# Patient Record
Sex: MALE | Race: WHITE | NOT HISPANIC OR LATINO | URBAN - METROPOLITAN AREA
[De-identification: names, ages, dates, MRNs, and addresses within clinical notes are randomized per-mention and may not be internally consistent; named-entity substitution may affect disease eponyms.]

---

## 2018-04-19 ENCOUNTER — EMERGENCY (EMERGENCY)
Facility: HOSPITAL | Age: 63
LOS: 0 days | Discharge: HOME | End: 2018-04-19
Attending: EMERGENCY MEDICINE
Payer: MEDICAID

## 2018-04-19 VITALS
DIASTOLIC BLOOD PRESSURE: 87 MMHG | SYSTOLIC BLOOD PRESSURE: 148 MMHG | TEMPERATURE: 98 F | HEART RATE: 97 BPM | HEIGHT: 70 IN | WEIGHT: 188.94 LBS | RESPIRATION RATE: 16 BRPM | OXYGEN SATURATION: 98 %

## 2018-04-19 VITALS
HEART RATE: 91 BPM | OXYGEN SATURATION: 97 % | RESPIRATION RATE: 18 BRPM | SYSTOLIC BLOOD PRESSURE: 133 MMHG | DIASTOLIC BLOOD PRESSURE: 87 MMHG
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
## 2018-04-19 DIAGNOSIS — M25.572 PAIN IN LEFT ANKLE AND JOINTS OF LEFT FOOT: ICD-10-CM
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
## 2018-04-19 DIAGNOSIS — S82.842A DISPLACED BIMALLEOLAR FRACTURE OF LEFT LOWER LEG, INITIAL ENCOUNTER FOR CLOSED FRACTURE: ICD-10-CM

## 2018-04-19 PROCEDURE — 73590 X-RAY EXAM OF LOWER LEG: CPT | Mod: 26,LT

## 2018-04-19 PROCEDURE — 72170 X-RAY EXAM OF PELVIS: CPT | Mod: 26

## 2018-04-19 PROCEDURE — 73600 X-RAY EXAM OF ANKLE: CPT | Mod: 26,LT

## 2018-04-19 PROCEDURE — 99284 EMERGENCY DEPT VISIT MOD MDM: CPT

## 2018-04-19 PROCEDURE — 73610 X-RAY EXAM OF ANKLE: CPT | Mod: 26,LT

## 2018-04-19 PROCEDURE — 73620 X-RAY EXAM OF FOOT: CPT | Mod: 26,LT

## 2018-04-19 NOTE — ED PROVIDER NOTE - MEDICAL DECISION MAKING DETAILS
Ddx: Ankle fracture/ ro tib/fib fx  Plan: xrays, pt declines pain meds, likely orthopedic reduction.

## 2018-04-19 NOTE — ED PROVIDER NOTE - MUSCULOSKELETAL MINIMAL EXAM
L. ankle is discolored. Has tenderness at bi malleolar. No numbness or tingling, able to move foot and toes. Has no firmness over anterior tibial compartment, no calf tenderness. Bruising to foot

## 2018-04-19 NOTE — ED PROVIDER NOTE - PROGRESS NOTE DETAILS
Pt has bimalleolar ankle fracture. Orthopedics reduced, and splinted, will chantelle Moreira in 1 week. Pt ok for d/c. No weight bearing and return precautions given.

## 2018-04-19 NOTE — ED ADULT NURSE NOTE - OBJECTIVE STATEMENT
pt presents to ED with a few days of worsening left ankle swelling and ecchymosis and increasing difficulty in ambulation. pt states he fell down a flight of stairs on Sunday. no LOC. No head, neck or back pain. Left ankle is grossly swollen with ecchymosis. +pedal pulses and sensation is intact. awaiting x-rays

## 2018-04-19 NOTE — ED PROVIDER NOTE - OBJECTIVE STATEMENT
Pt is a 64 yo gentleman with no significant past medical history who presents to the ED with L. ankle pain after a fall. He fell down a flight of stairs Sunday, no head strike, no loc. Has pain in L. ankle, and noticed swelling. No weakness, no loss of sensation, no other injuries or complaints. Denies any blood thinners. Declines any pain medications. No hx of ankle injury in the past.

## 2018-04-19 NOTE — ED ADULT TRIAGE NOTE - CHIEF COMPLAINT QUOTE
Left ankle pain swelling and redness extending up to the knee area s/p falling down a flight of stairs on Sunday.

## 2018-04-23 PROBLEM — Z00.00 ENCOUNTER FOR PREVENTIVE HEALTH EXAMINATION: Status: ACTIVE | Noted: 2018-04-23

## 2018-04-24 ENCOUNTER — APPOINTMENT (OUTPATIENT)
Dept: ORTHOPEDIC SURGERY | Facility: CLINIC | Age: 63
End: 2018-04-24
Payer: COMMERCIAL

## 2018-04-24 VITALS
HEIGHT: 69 IN | SYSTOLIC BLOOD PRESSURE: 161 MMHG | BODY MASS INDEX: 29.33 KG/M2 | WEIGHT: 198 LBS | HEART RATE: 92 BPM | DIASTOLIC BLOOD PRESSURE: 84 MMHG

## 2018-04-24 DIAGNOSIS — S82.842A DISPLACED BIMALLEOLAR FRACTURE OF LEFT LOWER LEG, INITIAL ENCOUNTER FOR CLOSED FRACTURE: ICD-10-CM

## 2018-04-24 PROCEDURE — 99203 OFFICE O/P NEW LOW 30 MIN: CPT

## 2018-04-25 ENCOUNTER — TRANSCRIPTION ENCOUNTER (OUTPATIENT)
Age: 63
End: 2018-04-25

## 2018-04-25 ENCOUNTER — APPOINTMENT (OUTPATIENT)
Dept: ORTHOPEDIC SURGERY | Facility: CLINIC | Age: 63
End: 2018-04-25

## 2018-04-25 ENCOUNTER — OUTPATIENT (OUTPATIENT)
Dept: OUTPATIENT SERVICES | Facility: HOSPITAL | Age: 63
LOS: 1 days | Discharge: HOME | End: 2018-04-25
Payer: MEDICAID

## 2018-04-25 VITALS
OXYGEN SATURATION: 98 % | RESPIRATION RATE: 18 BRPM | SYSTOLIC BLOOD PRESSURE: 145 MMHG | HEART RATE: 69 BPM | WEIGHT: 197.98 LBS | TEMPERATURE: 97 F | HEIGHT: 69 IN | DIASTOLIC BLOOD PRESSURE: 79 MMHG

## 2018-04-25 DIAGNOSIS — S82.842A DISPLACED BIMALLEOLAR FRACTURE OF LEFT LOWER LEG, INITIAL ENCOUNTER FOR CLOSED FRACTURE: ICD-10-CM

## 2018-04-25 DIAGNOSIS — S82.842B DISPLACED BIMALLEOLAR FRACTURE OF LEFT LOWER LEG, INITIAL ENCOUNTER FOR OPEN FRACTURE TYPE I OR II: ICD-10-CM

## 2018-04-25 DIAGNOSIS — Z01.818 ENCOUNTER FOR OTHER PREPROCEDURAL EXAMINATION: ICD-10-CM

## 2018-04-25 LAB
ANION GAP SERPL CALC-SCNC: 9 MMOL/L — SIGNIFICANT CHANGE UP (ref 5–17)
BASOPHILS # BLD AUTO: 0.04 K/UL — SIGNIFICANT CHANGE UP (ref 0–0.2)
BASOPHILS NFR BLD AUTO: 0.6 % — SIGNIFICANT CHANGE UP (ref 0–2)
BUN SERPL-MCNC: 18 MG/DL — SIGNIFICANT CHANGE UP (ref 7–23)
CALCIUM SERPL-MCNC: 8.8 MG/DL — SIGNIFICANT CHANGE UP (ref 8.5–10.1)
CHLORIDE SERPL-SCNC: 109 MMOL/L — HIGH (ref 96–108)
CO2 SERPL-SCNC: 25 MMOL/L — SIGNIFICANT CHANGE UP (ref 22–31)
CREAT SERPL-MCNC: 0.83 MG/DL — SIGNIFICANT CHANGE UP (ref 0.5–1.3)
EOSINOPHIL # BLD AUTO: 0.07 K/UL — SIGNIFICANT CHANGE UP (ref 0–0.5)
EOSINOPHIL NFR BLD AUTO: 1 % — SIGNIFICANT CHANGE UP (ref 0–6)
GLUCOSE SERPL-MCNC: 105 MG/DL — HIGH (ref 70–99)
HCT VFR BLD CALC: 42.4 % — SIGNIFICANT CHANGE UP (ref 39–50)
HGB BLD-MCNC: 13.9 G/DL — SIGNIFICANT CHANGE UP (ref 13–17)
IMM GRANULOCYTES NFR BLD AUTO: 0.1 % — SIGNIFICANT CHANGE UP (ref 0–1.5)
LYMPHOCYTES # BLD AUTO: 1.52 K/UL — SIGNIFICANT CHANGE UP (ref 1–3.3)
LYMPHOCYTES # BLD AUTO: 22.2 % — SIGNIFICANT CHANGE UP (ref 13–44)
MCHC RBC-ENTMCNC: 29.9 PG — SIGNIFICANT CHANGE UP (ref 27–34)
MCHC RBC-ENTMCNC: 32.8 GM/DL — SIGNIFICANT CHANGE UP (ref 32–36)
MCV RBC AUTO: 91.2 FL — SIGNIFICANT CHANGE UP (ref 80–100)
MONOCYTES # BLD AUTO: 0.97 K/UL — HIGH (ref 0–0.9)
MONOCYTES NFR BLD AUTO: 14.2 % — HIGH (ref 2–14)
NEUTROPHILS # BLD AUTO: 4.24 K/UL — SIGNIFICANT CHANGE UP (ref 1.8–7.4)
NEUTROPHILS NFR BLD AUTO: 61.9 % — SIGNIFICANT CHANGE UP (ref 43–77)
PLATELET # BLD AUTO: 277 K/UL — SIGNIFICANT CHANGE UP (ref 150–400)
POTASSIUM SERPL-MCNC: 3.9 MMOL/L — SIGNIFICANT CHANGE UP (ref 3.5–5.3)
POTASSIUM SERPL-SCNC: 3.9 MMOL/L — SIGNIFICANT CHANGE UP (ref 3.5–5.3)
RBC # BLD: 4.65 M/UL — SIGNIFICANT CHANGE UP (ref 4.2–5.8)
RBC # FLD: 12.7 % — SIGNIFICANT CHANGE UP (ref 10.3–14.5)
SODIUM SERPL-SCNC: 143 MMOL/L — SIGNIFICANT CHANGE UP (ref 135–145)
WBC # BLD: 6.85 K/UL — SIGNIFICANT CHANGE UP (ref 3.8–10.5)
WBC # FLD AUTO: 6.85 K/UL — SIGNIFICANT CHANGE UP (ref 3.8–10.5)

## 2018-04-25 PROCEDURE — 93010 ELECTROCARDIOGRAM REPORT: CPT | Mod: NC

## 2018-04-26 ENCOUNTER — APPOINTMENT (OUTPATIENT)
Dept: ORTHOPEDIC SURGERY | Facility: HOSPITAL | Age: 63
End: 2018-04-26

## 2018-04-26 ENCOUNTER — OUTPATIENT (OUTPATIENT)
Dept: OUTPATIENT SERVICES | Facility: HOSPITAL | Age: 63
LOS: 1 days | Discharge: HOME | End: 2018-04-26
Payer: MEDICAID

## 2018-04-26 VITALS
OXYGEN SATURATION: 99 % | TEMPERATURE: 97 F | DIASTOLIC BLOOD PRESSURE: 79 MMHG | HEART RATE: 71 BPM | RESPIRATION RATE: 18 BRPM | SYSTOLIC BLOOD PRESSURE: 113 MMHG | HEIGHT: 70 IN | WEIGHT: 197.98 LBS

## 2018-04-26 VITALS
RESPIRATION RATE: 18 BRPM | SYSTOLIC BLOOD PRESSURE: 121 MMHG | DIASTOLIC BLOOD PRESSURE: 68 MMHG | OXYGEN SATURATION: 96 % | HEART RATE: 74 BPM

## 2018-04-26 PROCEDURE — 76000 FLUOROSCOPY <1 HR PHYS/QHP: CPT | Mod: 26

## 2018-04-26 PROCEDURE — 27814 TREATMENT OF ANKLE FRACTURE: CPT | Mod: 26,LT

## 2018-04-26 RX ORDER — ONDANSETRON 8 MG/1
4 TABLET, FILM COATED ORAL ONCE
Qty: 0 | Refills: 0 | Status: DISCONTINUED | OUTPATIENT
Start: 2018-04-26 | End: 2018-04-26

## 2018-04-26 RX ORDER — MORPHINE SULFATE 50 MG/1
2 CAPSULE, EXTENDED RELEASE ORAL
Qty: 0 | Refills: 0 | Status: DISCONTINUED | OUTPATIENT
Start: 2018-04-26 | End: 2018-04-26

## 2018-04-26 RX ORDER — OXYCODONE HYDROCHLORIDE 5 MG/1
1 TABLET ORAL
Qty: 25 | Refills: 0
Start: 2018-04-26 | End: 2018-05-02

## 2018-04-26 RX ORDER — ACETAMINOPHEN 500 MG
1000 TABLET ORAL ONCE
Qty: 0 | Refills: 0 | Status: COMPLETED | OUTPATIENT
Start: 2018-04-26 | End: 2018-04-26

## 2018-04-26 RX ORDER — INFLUENZA VIRUS VACCINE 15; 15; 15; 15 UG/.5ML; UG/.5ML; UG/.5ML; UG/.5ML
0.5 SUSPENSION INTRAMUSCULAR ONCE
Qty: 0 | Refills: 0 | Status: DISCONTINUED | OUTPATIENT
Start: 2018-04-26 | End: 2018-05-11

## 2018-04-26 RX ORDER — ASPIRIN/CALCIUM CARB/MAGNESIUM 324 MG
1 TABLET ORAL
Qty: 30 | Refills: 0
Start: 2018-04-26 | End: 2018-05-25

## 2018-04-26 RX ORDER — IBUPROFEN 200 MG
2 TABLET ORAL
Qty: 0 | Refills: 0 | COMMUNITY

## 2018-04-26 RX ORDER — FENTANYL CITRATE 50 UG/ML
25 INJECTION INTRAVENOUS
Qty: 0 | Refills: 0 | Status: DISCONTINUED | OUTPATIENT
Start: 2018-04-26 | End: 2018-04-26

## 2018-04-26 RX ADMIN — FENTANYL CITRATE 25 MICROGRAM(S): 50 INJECTION INTRAVENOUS at 09:55

## 2018-04-26 RX ADMIN — FENTANYL CITRATE 25 MICROGRAM(S): 50 INJECTION INTRAVENOUS at 10:10

## 2018-04-26 RX ADMIN — Medication 400 MILLIGRAM(S): at 09:55

## 2018-04-26 RX ADMIN — Medication 1000 MILLIGRAM(S): at 10:10

## 2018-04-26 NOTE — ASU DISCHARGE PLAN (ADULT/PEDIATRIC). - SPECIAL INSTRUCTIONS
please refer to instruciton sheet provided by Dr. Moreira please refer to instruction sheet provided by Dr. Moreira  non weightbearing left lower extremity

## 2018-04-26 NOTE — ASU DISCHARGE PLAN (ADULT/PEDIATRIC). - MEDICATION SUMMARY - MEDICATIONS TO TAKE
I will START or STAY ON the medications listed below when I get home from the hospital:    Ecotrin 325 mg oral delayed release tablet  -- 1 tab(s) by mouth once a day MDD:1. do not skip doses  -- Swallow whole.  Do not crush.  Take with food or milk.    -- Indication: For dvt ppx    oxyCODONE 5 mg oral tablet  -- 1 tab(s) by mouth every 4 to 6 hours, As Needed -for severe pain MDD:4-6 tabs   -- Caution federal law prohibits the transfer of this drug to any person other  than the person for whom it was prescribed.  It is very important that you take or use this exactly as directed.  Do not skip doses or discontinue unless directed by your doctor.  May cause drowsiness.  Alcohol may intensify this effect.  Use care when operating dangerous machinery.  This prescription cannot be refilled.  Using more of this medication than prescribed may cause serious breathing problems.    -- Indication: For severe pain

## 2018-05-01 DIAGNOSIS — S82.842A DISPLACED BIMALLEOLAR FRACTURE OF LEFT LOWER LEG, INITIAL ENCOUNTER FOR CLOSED FRACTURE: ICD-10-CM

## 2018-05-01 DIAGNOSIS — Z87.891 PERSONAL HISTORY OF NICOTINE DEPENDENCE: ICD-10-CM
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
## 2018-05-01 DIAGNOSIS — M25.572 PAIN IN LEFT ANKLE AND JOINTS OF LEFT FOOT: ICD-10-CM
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
## 2018-05-08 ENCOUNTER — APPOINTMENT (OUTPATIENT)
Dept: ORTHOPEDIC SURGERY | Facility: CLINIC | Age: 63
End: 2018-05-08
Payer: COMMERCIAL

## 2018-05-08 DIAGNOSIS — Z78.9 OTHER SPECIFIED HEALTH STATUS: ICD-10-CM

## 2018-05-08 DIAGNOSIS — F19.90 OTHER PSYCHOACTIVE SUBSTANCE USE, UNSPECIFIED, UNCOMPLICATED: ICD-10-CM

## 2018-05-08 PROCEDURE — 73610 X-RAY EXAM OF ANKLE: CPT | Mod: LT

## 2018-05-08 PROCEDURE — 99024 POSTOP FOLLOW-UP VISIT: CPT

## 2018-05-16 ENCOUNTER — OTHER (OUTPATIENT)
Age: 63
End: 2018-05-16

## 2018-05-16 RX ORDER — CEPHALEXIN 500 MG/1
500 CAPSULE ORAL 4 TIMES DAILY
Qty: 40 | Refills: 0 | Status: ACTIVE | COMMUNITY
Start: 2018-05-16 | End: 1900-01-01

## 2018-05-18 ENCOUNTER — APPOINTMENT (OUTPATIENT)
Dept: ORTHOPEDIC SURGERY | Facility: CLINIC | Age: 63
End: 2018-05-18
Payer: COMMERCIAL

## 2018-05-18 PROCEDURE — 99024 POSTOP FOLLOW-UP VISIT: CPT

## 2018-05-18 RX ORDER — OXYCODONE 5 MG/1
5 TABLET ORAL
Qty: 25 | Refills: 0 | Status: ACTIVE | COMMUNITY
Start: 2018-04-26

## 2018-05-23 ENCOUNTER — APPOINTMENT (OUTPATIENT)
Dept: ORTHOPEDIC SURGERY | Facility: CLINIC | Age: 63
End: 2018-05-23
Payer: COMMERCIAL

## 2018-05-23 VITALS — BODY MASS INDEX: 29.33 KG/M2 | WEIGHT: 198 LBS | HEIGHT: 69 IN

## 2018-05-23 PROCEDURE — 99024 POSTOP FOLLOW-UP VISIT: CPT

## 2018-05-30 ENCOUNTER — APPOINTMENT (OUTPATIENT)
Dept: ORTHOPEDIC SURGERY | Facility: CLINIC | Age: 63
End: 2018-05-30
Payer: COMMERCIAL

## 2018-05-30 PROCEDURE — 99024 POSTOP FOLLOW-UP VISIT: CPT

## 2018-06-05 ENCOUNTER — APPOINTMENT (OUTPATIENT)
Dept: ORTHOPEDIC SURGERY | Facility: CLINIC | Age: 63
End: 2018-06-05
Payer: COMMERCIAL

## 2018-06-05 PROCEDURE — 73610 X-RAY EXAM OF ANKLE: CPT | Mod: LT

## 2018-06-05 PROCEDURE — 99024 POSTOP FOLLOW-UP VISIT: CPT

## 2018-06-26 ENCOUNTER — APPOINTMENT (OUTPATIENT)
Dept: ORTHOPEDIC SURGERY | Facility: CLINIC | Age: 63
End: 2018-06-26
Payer: COMMERCIAL

## 2018-06-26 PROCEDURE — 73610 X-RAY EXAM OF ANKLE: CPT | Mod: LT

## 2018-06-26 PROCEDURE — 99024 POSTOP FOLLOW-UP VISIT: CPT

## 2018-08-21 ENCOUNTER — APPOINTMENT (OUTPATIENT)
Dept: ORTHOPEDIC SURGERY | Facility: CLINIC | Age: 63
End: 2018-08-21
Payer: COMMERCIAL

## 2018-08-21 PROCEDURE — 73610 X-RAY EXAM OF ANKLE: CPT | Mod: LT

## 2018-08-21 PROCEDURE — 99213 OFFICE O/P EST LOW 20 MIN: CPT

## 2018-10-16 ENCOUNTER — APPOINTMENT (OUTPATIENT)
Dept: ORTHOPEDIC SURGERY | Facility: CLINIC | Age: 63
End: 2018-10-16
Payer: COMMERCIAL

## 2018-10-16 PROCEDURE — 99213 OFFICE O/P EST LOW 20 MIN: CPT

## 2018-10-16 PROCEDURE — 73610 X-RAY EXAM OF ANKLE: CPT | Mod: LT

## 2018-10-16 RX ORDER — CEPHALEXIN 500 MG/1
500 CAPSULE ORAL 4 TIMES DAILY
Qty: 40 | Refills: 0 | Status: ACTIVE | COMMUNITY
Start: 2018-10-16 | End: 1900-01-01

## 2018-10-16 RX ORDER — SULFAMETHOXAZOLE AND TRIMETHOPRIM 800; 160 MG/1; MG/1
800-160 TABLET ORAL TWICE DAILY
Qty: 20 | Refills: 0 | Status: ACTIVE | COMMUNITY
Start: 2018-10-16 | End: 1900-01-01

## 2018-10-17 LAB
BASOPHILS # BLD AUTO: 0.03 K/UL
BASOPHILS NFR BLD AUTO: 0.4 %
CRP SERPL-MCNC: 0.42 MG/DL
EOSINOPHIL # BLD AUTO: 0.05 K/UL
EOSINOPHIL NFR BLD AUTO: 0.7 %
ERYTHROCYTE [SEDIMENTATION RATE] IN BLOOD BY WESTERGREN METHOD: 28 MM/HR
HCT VFR BLD CALC: 41.3 %
HGB BLD-MCNC: 13.8 G/DL
IMM GRANULOCYTES NFR BLD AUTO: 0.1 %
LYMPHOCYTES # BLD AUTO: 1.67 K/UL
LYMPHOCYTES NFR BLD AUTO: 24 %
MAN DIFF?: NORMAL
MCHC RBC-ENTMCNC: 30.3 PG
MCHC RBC-ENTMCNC: 33.4 GM/DL
MCV RBC AUTO: 90.6 FL
MONOCYTES # BLD AUTO: 0.85 K/UL
MONOCYTES NFR BLD AUTO: 12.2 %
NEUTROPHILS # BLD AUTO: 4.35 K/UL
NEUTROPHILS NFR BLD AUTO: 62.6 %
PLATELET # BLD AUTO: 247 K/UL
RBC # BLD: 4.56 M/UL
RBC # FLD: 14.1 %
WBC # FLD AUTO: 6.96 K/UL

## 2018-11-06 ENCOUNTER — APPOINTMENT (OUTPATIENT)
Dept: ORTHOPEDIC SURGERY | Facility: CLINIC | Age: 63
End: 2018-11-06

## 2019-12-17 ENCOUNTER — APPOINTMENT (OUTPATIENT)
Dept: ORTHOPEDIC SURGERY | Facility: CLINIC | Age: 64
End: 2019-12-17
Payer: COMMERCIAL

## 2019-12-17 VITALS
DIASTOLIC BLOOD PRESSURE: 90 MMHG | WEIGHT: 194 LBS | SYSTOLIC BLOOD PRESSURE: 157 MMHG | BODY MASS INDEX: 27.77 KG/M2 | HEART RATE: 67 BPM | HEIGHT: 70 IN

## 2019-12-17 PROCEDURE — 99213 OFFICE O/P EST LOW 20 MIN: CPT

## 2019-12-17 PROCEDURE — 73610 X-RAY EXAM OF ANKLE: CPT | Mod: LT

## 2019-12-17 NOTE — PHYSICAL EXAM
[de-identified] : Left ankle exam\par 1x1 cm area of wound breakdown proximally w bloody discharge. no gross purulence\par Mild swelling of leg\par mild ttp about the ankle\par calf is soft and nontender\par ankle rom df 10 pf 30 normal subtalar motion\par Able to flex and extend all toes\par Sensation intact throughout\par brisk capillary refill. \par \par  [de-identified] : Imaging: \par The following radiographs were ordered and read by me during this patients visit. I reviewed each radiograph in detail with the patient and discussed the findings as highlighted below. \par \par 3 views of the left ankle were obtained today. Status post open reduction internal fixation there is healing of the fracture or lucencies around the plate concerning for infection.

## 2019-12-17 NOTE — DISCUSSION/SUMMARY
[de-identified] : 64-year-old male 18 months status post open reduction internal fixation of ankle fracture. There certainly concern today for infection given his persistent wound issues a new wound breakdown and drainage. We will send laboratory markers including CBC ESR CRP we did discuss removal of hardware irrigation debridement possible Vac dressing and possible plastic surgery closure. We did discuss likely need for long-term antibiotics concern for underlying possible osteomyelitis. We discussed surgery postoperative protocol restrictions in detail. Risks benefits alternatives of surgery were discussed including but not limited to risk such as bleeding infection nerve and vessel injury continued pain stiffness need for future surgery medical complications such as DVT or PE anesthesia complications. All questions answered.\par

## 2019-12-17 NOTE — HISTORY OF PRESENT ILLNESS
[de-identified] : 64 y/o M presents s/p Open reduction and internal fixation of left bimalleolar ankle fracture 4/26/18. Surgery was complicated by superficial wound breakdown. There are several issues in the postoperative period his compliance with daily dressing changes. He has not been seen for several months. He reports that the wound in his ankle I closed but he developed a blister last week that led to purulent drainage. There has been a small area of drainage in the superficial aspect of the wound. He has not had any fevers or chills she continued to right his motorcycle is here today to discuss. \par \par last year, surgery was recommended for I&D. hardware removal and long term iv abx.  he never follows up.  wound drainage has persisted

## 2019-12-20 NOTE — PATIENT PROFILE ADULT. - NS TRANSFER PATIENT BELONGINGS
Occupational Therapy Screening: 
Services are not indicated at this time. [facility] therapy based on results obtained during the nursing admission assessment. The patients chart was reviewed and the patient is not appropriate for a skilled therapy evaluation at this time. Please consult occupational therapy if any therapy needs arise. Thank you. Марина MELGAR 10/2019 FUNCTIONAL STATUS PRIOR TO ADMISSION: Patient was independent and active without use of DME. Plays golf 4x/week. Still drives.   
 
HOME SUPPORT PRIOR TO ADMISSION: The patient lived with son and daughter-in-law but did not require assist. 
 Clothing

## 2019-12-23 ENCOUNTER — OUTPATIENT (OUTPATIENT)
Dept: OUTPATIENT SERVICES | Facility: HOSPITAL | Age: 64
LOS: 1 days | Discharge: HOME | End: 2019-12-23
Payer: COMMERCIAL

## 2019-12-23 VITALS
OXYGEN SATURATION: 98 % | DIASTOLIC BLOOD PRESSURE: 80 MMHG | SYSTOLIC BLOOD PRESSURE: 137 MMHG | RESPIRATION RATE: 18 BRPM | TEMPERATURE: 98 F | HEART RATE: 85 BPM | WEIGHT: 187.61 LBS | HEIGHT: 67 IN

## 2019-12-23 DIAGNOSIS — S82.842A DISPLACED BIMALLEOLAR FRACTURE OF LEFT LOWER LEG, INITIAL ENCOUNTER FOR CLOSED FRACTURE: ICD-10-CM

## 2019-12-23 DIAGNOSIS — Z98.890 OTHER SPECIFIED POSTPROCEDURAL STATES: Chronic | ICD-10-CM

## 2019-12-23 DIAGNOSIS — Z01.818 ENCOUNTER FOR OTHER PREPROCEDURAL EXAMINATION: ICD-10-CM

## 2019-12-23 LAB
ANION GAP SERPL CALC-SCNC: 4 MMOL/L — LOW (ref 5–17)
BASOPHILS # BLD AUTO: 0.05 K/UL — SIGNIFICANT CHANGE UP (ref 0–0.2)
BASOPHILS NFR BLD AUTO: 0.6 % — SIGNIFICANT CHANGE UP (ref 0–2)
BUN SERPL-MCNC: 19 MG/DL — SIGNIFICANT CHANGE UP (ref 7–23)
CALCIUM SERPL-MCNC: 9.2 MG/DL — SIGNIFICANT CHANGE UP (ref 8.5–10.1)
CHLORIDE SERPL-SCNC: 108 MMOL/L — SIGNIFICANT CHANGE UP (ref 96–108)
CO2 SERPL-SCNC: 29 MMOL/L — SIGNIFICANT CHANGE UP (ref 22–31)
CREAT SERPL-MCNC: 0.88 MG/DL — SIGNIFICANT CHANGE UP (ref 0.5–1.3)
EOSINOPHIL # BLD AUTO: 0.07 K/UL — SIGNIFICANT CHANGE UP (ref 0–0.5)
EOSINOPHIL NFR BLD AUTO: 0.9 % — SIGNIFICANT CHANGE UP (ref 0–6)
GLUCOSE SERPL-MCNC: 97 MG/DL — SIGNIFICANT CHANGE UP (ref 70–99)
HCT VFR BLD CALC: 44.4 % — SIGNIFICANT CHANGE UP (ref 39–50)
HGB BLD-MCNC: 14.5 G/DL — SIGNIFICANT CHANGE UP (ref 13–17)
IMM GRANULOCYTES NFR BLD AUTO: 0.3 % — SIGNIFICANT CHANGE UP (ref 0–1.5)
INR BLD: 0.98 RATIO — SIGNIFICANT CHANGE UP (ref 0.88–1.16)
LYMPHOCYTES # BLD AUTO: 1.95 K/UL — SIGNIFICANT CHANGE UP (ref 1–3.3)
LYMPHOCYTES # BLD AUTO: 24.7 % — SIGNIFICANT CHANGE UP (ref 13–44)
MCHC RBC-ENTMCNC: 29.8 PG — SIGNIFICANT CHANGE UP (ref 27–34)
MCHC RBC-ENTMCNC: 32.7 GM/DL — SIGNIFICANT CHANGE UP (ref 32–36)
MCV RBC AUTO: 91.4 FL — SIGNIFICANT CHANGE UP (ref 80–100)
MONOCYTES # BLD AUTO: 0.97 K/UL — HIGH (ref 0–0.9)
MONOCYTES NFR BLD AUTO: 12.3 % — SIGNIFICANT CHANGE UP (ref 2–14)
NEUTROPHILS # BLD AUTO: 4.82 K/UL — SIGNIFICANT CHANGE UP (ref 1.8–7.4)
NEUTROPHILS NFR BLD AUTO: 61.2 % — SIGNIFICANT CHANGE UP (ref 43–77)
NRBC # BLD: 0 /100 WBCS — SIGNIFICANT CHANGE UP (ref 0–0)
PLATELET # BLD AUTO: 274 K/UL — SIGNIFICANT CHANGE UP (ref 150–400)
POTASSIUM SERPL-MCNC: 4.2 MMOL/L — SIGNIFICANT CHANGE UP (ref 3.5–5.3)
POTASSIUM SERPL-SCNC: 4.2 MMOL/L — SIGNIFICANT CHANGE UP (ref 3.5–5.3)
PROTHROM AB SERPL-ACNC: 11 SEC — SIGNIFICANT CHANGE UP (ref 10–12.9)
RBC # BLD: 4.86 M/UL — SIGNIFICANT CHANGE UP (ref 4.2–5.8)
RBC # FLD: 12.7 % — SIGNIFICANT CHANGE UP (ref 10.3–14.5)
SODIUM SERPL-SCNC: 141 MMOL/L — SIGNIFICANT CHANGE UP (ref 135–145)
WBC # BLD: 7.88 K/UL — SIGNIFICANT CHANGE UP (ref 3.8–10.5)
WBC # FLD AUTO: 7.88 K/UL — SIGNIFICANT CHANGE UP (ref 3.8–10.5)

## 2019-12-23 PROCEDURE — 93010 ELECTROCARDIOGRAM REPORT: CPT

## 2019-12-23 RX ORDER — SODIUM CHLORIDE 9 MG/ML
3 INJECTION INTRAMUSCULAR; INTRAVENOUS; SUBCUTANEOUS EVERY 8 HOURS
Refills: 0 | Status: DISCONTINUED | OUTPATIENT
Start: 2020-01-06 | End: 2020-01-06

## 2019-12-23 NOTE — H&P PST ADULT - COMMENTS
colonoscopy- never had Left ankle redness, swelling, and pinpoint opening to ankle area colonoscopy- never had. scheduled for a fecal stool test later this month

## 2019-12-23 NOTE — H&P PST ADULT - HISTORY OF PRESENT ILLNESS
64 year with no significant past medical history present with reddnees and  ro left ankle 64 year with no significant past medical history present with redness and a pin point opening with serous drainage to left ankle. In 2018 he had surgery to the ankle after falling down the steps at home. He is schedule for left ankle removal of hardware, irrigation, and debridement, on 1/6/20

## 2019-12-23 NOTE — H&P PST ADULT - NSICDXPROBLEM_GEN_ALL_CORE_FT
PROBLEM DIAGNOSES  Problem: Displaced bimalleolar fracture of left lower leg  Assessment and Plan: Left ankle removal of hardware. 1/6/2020

## 2019-12-23 NOTE — H&P PST ADULT - ASSESSMENT
64 year with no significant past medical history present with redness and a pin point opening with serous drainage to left ankle. In 2018 he had surgery to the ankle after falling down the steps at home. He is schedule for left ankle removal of hardware, irrigation, and debridement, on 20.    CAPRINI SCORE [CLOT]    AGE RELATED RISK FACTORS                                                       MOBILITY RELATED FACTORS  [ ] Age 41-60 years                                            (1 Point)                  [ ] Bed rest                                                        (1 Point)  [ ] Age: 61-74 years                                           (2 Points)                 [ ] Plaster cast                                                   (2 Points)  [ ] Age= 75 years                                              (3 Points)                 [ ] Bed bound for more than 72 hours                 (2 Points)    DISEASE RELATED RISK FACTORS                                               GENDER SPECIFIC FACTORS  [ x] Edema in the lower extremities                       (1 Point)                  [ ] Pregnancy                                                     (1 Point)  [ ] Varicose veins                                               (1 Point)                  [ ] Post-partum < 6 weeks                                   (1 Point)             [ ] BMI > 25 Kg/m2                                            (1 Point)                  [ ] Hormonal therapy  or oral contraception          (1 Point)                 [ ] Sepsis (in the previous month)                        (1 Point)                  [ ] History of pregnancy complications                 (1 point)  [ ] Pneumonia or serious lung disease                                               [ ] Unexplained or recurrent                     (1 Point)           (in the previous month)                               (1 Point)  [ ] Abnormal pulmonary function test                     (1 Point)                 SURGERY RELATED RISK FACTORS  [ ] Acute myocardial infarction                              (1 Point)                 [ ]  Section                                             (1 Point)  [ ] Congestive heart failure (in the previous month)  (1 Point)               [ ] Minor surgery                                                  (1 Point)   [ ] Inflammatory bowel disease                             (1 Point)                 [ ] Arthroscopic surgery                                        (2 Points)  [ ] Central venous access                                      (2 Points)                [ x] General surgery lasting more than 45 minutes   (2 Points)       [ ] Stroke (in the previous month)                          (5 Points)               [ ] Elective arthroplasty                                         (5 Points)                                                                                                                                               HEMATOLOGY RELATED FACTORS                                                 TRAUMA RELATED RISK FACTORS  [ ] Prior episodes of VTE                                     (3 Points)                [ ] Fracture of the hip, pelvis, or leg                       (5 Points)  [ ] Positive family history for VTE                         (3 Points)                 [ ] Acute spinal cord injury (in the previous month)  (5 Points)  [ ] Prothrombin 30917 A                                     (3 Points)                 [ ] Paralysis  (less than 1 month)                             (5 Points)  [ ] Factor V Leiden                                             (3 Points)                  [ ] Multiple Trauma within 1 month                        (5 Points)  [ ] Lupus anticoagulants                                     (3 Points)                                                           [ ] Anticardiolipin antibodies                               (3 Points)                                                       [ ] High homocysteine in the blood                      (3 Points)                                             [ ] Other congenital or acquired thrombophilia      (3 Points)                                                [ ] Heparin induced thrombocytopenia                  (3 Points)                                          Total Score [      3    ]    Caprini Score 0 - 2:  Low Risk, No VTE Prophylaxis required for most patients, encourage ambulation  Caprini Score 3 - 6:  At Risk, pharmacologic VTE prophylaxis is indicated for most patients (in the absence of a contraindication)  Caprini Score Greater than or = 7:  High Risk, pharmacologic VTE prophylaxis is indicated for most patients (in the absence of a contraindication)

## 2020-01-05 ENCOUNTER — TRANSCRIPTION ENCOUNTER (OUTPATIENT)
Age: 65
End: 2020-01-05

## 2020-01-06 ENCOUNTER — INPATIENT (INPATIENT)
Facility: HOSPITAL | Age: 65
LOS: 4 days | Discharge: HOME | End: 2020-01-11
Attending: ORTHOPAEDIC SURGERY | Admitting: ORTHOPAEDIC SURGERY
Payer: COMMERCIAL

## 2020-01-06 ENCOUNTER — RESULT REVIEW (OUTPATIENT)
Age: 65
End: 2020-01-06

## 2020-01-06 ENCOUNTER — APPOINTMENT (OUTPATIENT)
Dept: ORTHOPEDIC SURGERY | Facility: HOSPITAL | Age: 65
End: 2020-01-06

## 2020-01-06 VITALS
HEIGHT: 67 IN | SYSTOLIC BLOOD PRESSURE: 147 MMHG | HEART RATE: 65 BPM | TEMPERATURE: 98 F | OXYGEN SATURATION: 99 % | WEIGHT: 194.23 LBS | RESPIRATION RATE: 14 BRPM | DIASTOLIC BLOOD PRESSURE: 72 MMHG

## 2020-01-06 DIAGNOSIS — Z98.890 OTHER SPECIFIED POSTPROCEDURAL STATES: Chronic | ICD-10-CM

## 2020-01-06 DIAGNOSIS — B95.61 METHICILLIN SUSCEPTIBLE STAPHYLOCOCCUS AUREUS INFECTION AS THE CAUSE OF DISEASES CLASSIFIED ELSEWHERE: ICD-10-CM

## 2020-01-06 DIAGNOSIS — T84.625A INFECTION AND INFLAMMATORY REACTION DUE TO INTERNAL FIXATION DEVICE OF LEFT FIBULA, INITIAL ENCOUNTER: ICD-10-CM

## 2020-01-06 LAB — ERYTHROCYTE [SEDIMENTATION RATE] IN BLOOD: 7 MM/HR — SIGNIFICANT CHANGE UP (ref 0–20)

## 2020-01-06 PROCEDURE — 20680 REMOVAL OF IMPLANT DEEP: CPT | Mod: LT

## 2020-01-06 PROCEDURE — 88300 SURGICAL PATH GROSS: CPT | Mod: 26

## 2020-01-06 RX ORDER — BENZOCAINE AND MENTHOL 5; 1 G/100ML; G/100ML
1 LIQUID ORAL
Refills: 0 | Status: DISCONTINUED | OUTPATIENT
Start: 2020-01-07 | End: 2020-01-06

## 2020-01-06 RX ORDER — ACETAMINOPHEN 500 MG
1000 TABLET ORAL ONCE
Refills: 0 | Status: COMPLETED | OUTPATIENT
Start: 2020-01-06 | End: 2020-01-06

## 2020-01-06 RX ORDER — OXYCODONE HYDROCHLORIDE 5 MG/1
10 TABLET ORAL EVERY 4 HOURS
Refills: 0 | Status: DISCONTINUED | OUTPATIENT
Start: 2020-01-07 | End: 2020-01-06

## 2020-01-06 RX ORDER — ACETAMINOPHEN 500 MG
650 TABLET ORAL EVERY 6 HOURS
Refills: 0 | Status: DISCONTINUED | OUTPATIENT
Start: 2020-01-07 | End: 2020-01-06

## 2020-01-06 RX ORDER — METOCLOPRAMIDE HCL 10 MG
10 TABLET ORAL EVERY 6 HOURS
Refills: 0 | Status: DISCONTINUED | OUTPATIENT
Start: 2020-01-07 | End: 2020-01-06

## 2020-01-06 RX ORDER — ONDANSETRON 8 MG/1
4 TABLET, FILM COATED ORAL EVERY 6 HOURS
Refills: 0 | Status: DISCONTINUED | OUTPATIENT
Start: 2020-01-07 | End: 2020-01-06

## 2020-01-06 RX ORDER — HYDROMORPHONE HYDROCHLORIDE 2 MG/ML
0.25 INJECTION INTRAMUSCULAR; INTRAVENOUS; SUBCUTANEOUS
Refills: 0 | Status: DISCONTINUED | OUTPATIENT
Start: 2020-01-06 | End: 2020-01-06

## 2020-01-06 RX ORDER — TRAMADOL HYDROCHLORIDE 50 MG/1
50 TABLET ORAL EVERY 6 HOURS
Refills: 0 | Status: DISCONTINUED | OUTPATIENT
Start: 2020-01-07 | End: 2020-01-06

## 2020-01-06 RX ORDER — SODIUM CHLORIDE 9 MG/ML
1000 INJECTION, SOLUTION INTRAVENOUS
Refills: 0 | Status: DISCONTINUED | OUTPATIENT
Start: 2020-01-06 | End: 2020-01-06

## 2020-01-06 RX ORDER — HYDROMORPHONE HYDROCHLORIDE 2 MG/ML
1 INJECTION INTRAMUSCULAR; INTRAVENOUS; SUBCUTANEOUS
Refills: 0 | Status: DISCONTINUED | OUTPATIENT
Start: 2020-01-07 | End: 2020-01-06

## 2020-01-06 RX ORDER — HYDROMORPHONE HYDROCHLORIDE 2 MG/ML
0.5 INJECTION INTRAMUSCULAR; INTRAVENOUS; SUBCUTANEOUS
Refills: 0 | Status: DISCONTINUED | OUTPATIENT
Start: 2020-01-06 | End: 2020-01-06

## 2020-01-06 RX ORDER — SENNA PLUS 8.6 MG/1
2 TABLET ORAL AT BEDTIME
Refills: 0 | Status: DISCONTINUED | OUTPATIENT
Start: 2020-01-07 | End: 2020-01-06

## 2020-01-06 RX ORDER — OXYCODONE HYDROCHLORIDE 5 MG/1
5 TABLET ORAL EVERY 4 HOURS
Refills: 0 | Status: DISCONTINUED | OUTPATIENT
Start: 2020-01-07 | End: 2020-01-06

## 2020-01-06 RX ORDER — ONDANSETRON 8 MG/1
4 TABLET, FILM COATED ORAL ONCE
Refills: 0 | Status: DISCONTINUED | OUTPATIENT
Start: 2020-01-06 | End: 2020-01-06

## 2020-01-06 RX ADMIN — Medication 400 MILLIGRAM(S): at 17:14

## 2020-01-06 RX ADMIN — SODIUM CHLORIDE 120 MILLILITER(S): 9 INJECTION, SOLUTION INTRAVENOUS at 17:14

## 2020-01-06 RX ADMIN — Medication 1000 MILLIGRAM(S): at 17:44

## 2020-01-06 RX ADMIN — Medication 100 MILLIGRAM(S): at 22:34

## 2020-01-06 RX ADMIN — SODIUM CHLORIDE 3 MILLILITER(S): 9 INJECTION INTRAMUSCULAR; INTRAVENOUS; SUBCUTANEOUS at 22:34

## 2020-01-06 NOTE — PROGRESS NOTE ADULT - SUBJECTIVE AND OBJECTIVE BOX
Orthopedics Post-op Check  POD 0  Patient seen and examined at bedside. Pain is controlled. Pt feeling well. No nausea or vomiting.    Vital Signs Last 24 Hrs  T(C): 36.4 (01-06-20 @ 17:14), Max: 36.5 (01-06-20 @ 13:14)  T(F): 97.5 (01-06-20 @ 17:14), Max: 97.7 (01-06-20 @ 13:14)  HR: 62 (01-06-20 @ 18:44) (62 - 74)  BP: 138/70 (01-06-20 @ 18:44) (133/73 - 150/81)  BP(mean): --  RR: 15 (01-06-20 @ 18:44) (14 - 23)  SpO2: 95% (01-06-20 @ 18:44) (95% - 99%)              Exam:  Gen: NAD, resting comfortably  LLE  Dressing c/d/i  +EHL/FHL/TA/GS  SILT L2-S1  +DP/PT 2+  Calf NTTP b/l  Compartments soft and compressible    A/P:  64yMale Stable POD 0  s/p L Ankle JAMEEL, WVA    -FU labs  -WBAT LLE in CAM boot  -Pain control  -PT/OT  -Ppx ABX  -DVT PE ppx- hold until POD1  -Incentive spirometry

## 2020-01-06 NOTE — BRIEF OPERATIVE NOTE - OPERATION/FINDINGS
left ankle infected hardware  removal of hardware, irrigation and debridement, removal of necrotic tissue  placement of incisional vacuum

## 2020-01-06 NOTE — ASU PATIENT PROFILE, ADULT - VISION (WITH CORRECTIVE LENSES IF THE PATIENT USUALLY WEARS THEM):
Normal vision: sees adequately in most situations; can see medication labels, newsprint Partially impaired: cannot see medication labels or newsprint, but can see obstacles in path, and the surrounding layout; can count fingers at arm's length/eye glasses

## 2020-01-07 ENCOUNTER — TRANSCRIPTION ENCOUNTER (OUTPATIENT)
Age: 65
End: 2020-01-07

## 2020-01-07 LAB
CRP SERPL-MCNC: 0.33 MG/DL — SIGNIFICANT CHANGE UP (ref 0–0.4)
GRAM STN FLD: SIGNIFICANT CHANGE UP
SPECIMEN SOURCE: SIGNIFICANT CHANGE UP

## 2020-01-07 RX ORDER — INFLUENZA VIRUS VACCINE 15; 15; 15; 15 UG/.5ML; UG/.5ML; UG/.5ML; UG/.5ML
0.5 SUSPENSION INTRAMUSCULAR ONCE
Refills: 0 | Status: DISCONTINUED | OUTPATIENT
Start: 2020-01-07 | End: 2020-01-06

## 2020-01-07 RX ORDER — VANCOMYCIN HCL 1 G
1000 VIAL (EA) INTRAVENOUS EVERY 12 HOURS
Refills: 0 | Status: DISCONTINUED | OUTPATIENT
Start: 2020-01-08 | End: 2020-01-06

## 2020-01-07 RX ORDER — VANCOMYCIN HCL 1 G
VIAL (EA) INTRAVENOUS
Refills: 0 | Status: DISCONTINUED | OUTPATIENT
Start: 2020-01-07 | End: 2020-01-06

## 2020-01-07 RX ORDER — CEFTRIAXONE 500 MG/1
2000 INJECTION, POWDER, FOR SOLUTION INTRAMUSCULAR; INTRAVENOUS EVERY 24 HOURS
Refills: 0 | Status: DISCONTINUED | OUTPATIENT
Start: 2020-01-07 | End: 2020-01-06

## 2020-01-07 RX ORDER — ASPIRIN/CALCIUM CARB/MAGNESIUM 324 MG
1 TABLET ORAL
Qty: 30 | Refills: 0
Start: 2020-01-07 | End: 2020-02-05

## 2020-01-07 RX ORDER — VANCOMYCIN HCL 1 G
1000 VIAL (EA) INTRAVENOUS ONCE
Refills: 0 | Status: COMPLETED | OUTPATIENT
Start: 2020-01-07 | End: 2020-01-07

## 2020-01-07 RX ORDER — SODIUM CHLORIDE 9 MG/ML
1000 INJECTION, SOLUTION INTRAVENOUS
Refills: 0 | Status: DISCONTINUED | OUTPATIENT
Start: 2020-01-07 | End: 2020-01-07

## 2020-01-07 RX ORDER — ACETAMINOPHEN 500 MG
1000 TABLET ORAL ONCE
Refills: 0 | Status: DISCONTINUED | OUTPATIENT
Start: 2020-01-07 | End: 2020-01-06

## 2020-01-07 RX ADMIN — Medication 100 MILLIGRAM(S): at 13:04

## 2020-01-07 RX ADMIN — Medication 1 TABLET(S): at 11:43

## 2020-01-07 RX ADMIN — CEFTRIAXONE 100 MILLIGRAM(S): 500 INJECTION, POWDER, FOR SOLUTION INTRAMUSCULAR; INTRAVENOUS at 17:28

## 2020-01-07 RX ADMIN — SODIUM CHLORIDE 3 MILLILITER(S): 9 INJECTION INTRAMUSCULAR; INTRAVENOUS; SUBCUTANEOUS at 13:01

## 2020-01-07 RX ADMIN — Medication 250 MILLIGRAM(S): at 14:48

## 2020-01-07 RX ADMIN — SODIUM CHLORIDE 3 MILLILITER(S): 9 INJECTION INTRAMUSCULAR; INTRAVENOUS; SUBCUTANEOUS at 05:44

## 2020-01-07 RX ADMIN — SODIUM CHLORIDE 3 MILLILITER(S): 9 INJECTION INTRAMUSCULAR; INTRAVENOUS; SUBCUTANEOUS at 21:11

## 2020-01-07 RX ADMIN — Medication 100 MILLIGRAM(S): at 05:44

## 2020-01-07 NOTE — PHYSICAL THERAPY INITIAL EVALUATION ADULT - GENERAL OBSERVATIONS, REHAB EVAL
pt encountered in bed supine, NAD aaox4, has dressing to left ankle s/p hardware removal sec to infection

## 2020-01-07 NOTE — DISCHARGE NOTE PROVIDER - CARE PROVIDER_API CALL
Domenic Moreira)  Orthopaedic Surgery  [address], [address]  [place] [address]  Phone: [phone]  Fax: [phone]  Follow Up Time:

## 2020-01-07 NOTE — DISCHARGE NOTE PROVIDER - CARE PROVIDERS DIRECT ADDRESSES
[email or web address]

## 2020-01-07 NOTE — DISCHARGE NOTE PROVIDER - HOSPITAL COURSE
Orthopedic Summary        H&P:    Pt is a64y Male PAST MEDICAL & SURGICAL HISTORY:    No pertinent past medical history    History of ankle surgery: left ankle 4/2018             Now s/p I&D. Pt is afebrile with stable vital signs. Pain is controlled. Alert and Oriented. Exam reveals neurovascularly intact extremity.        Vital Signs Last 24 Hrs    T(C): 35.9 (01-07-20 @ 07:00), Max: 36.5 (01-06-20 @ 13:14)    T(F): 96.7 (01-07-20 @ 07:00), Max: 97.7 (01-06-20 @ 13:14)    HR: 74 (01-07-20 @ 07:00) (62 - 74)    BP: 131/67 (01-07-20 @ 07:00) (100/55 - 150/81)    BP(mean): --    RR: 16 (01-07-20 @ 07:00) (14 - 23)    SpO2: 96% (01-07-20 @ 07:00) (94% - 99%)                Hospital Course:    Patient presented to [facility] ED with pain and swelling of the L ankle s/p ORIF. Pt was admitted and underwent the surgical procedure. ID was consulted and empiric antibiotics were started after the procedure and refined when OR cultures resulted. Pt was placed on DVT PE ppx which was held before surgery and then restarted after surgery. SCDs were in place while in bed throughout the stay.         Routine consults were obtained from Physical Therapy and Infectious Disease. Patient was placed on anticoagulation.  Pertinent home medications were continued.  Daily labs were followed.          We anticipate DC to home and antibiotics when medically cleared. Please see separate summary from Medical team for any additionl info. See Med Rec for dosing and duration of antibiotics. The orthopedic Attending is aware and agrees with above.

## 2020-01-07 NOTE — PROGRESS NOTE ADULT - SUBJECTIVE AND OBJECTIVE BOX
Orthopedics      Patient seen and examined at bedside. Feeling well. Pain controlled. No n/v. No acute events overnight.    Vital Signs Last 24 Hrs  T(C): 36.2 (01-07-20 @ 03:04), Max: 36.5 (01-06-20 @ 13:14)  T(F): 97.2 (01-07-20 @ 03:04), Max: 97.7 (01-06-20 @ 13:14)  HR: 70 (01-07-20 @ 03:04) (62 - 74)  BP: 100/55 (01-07-20 @ 03:04) (100/55 - 150/81)  BP(mean): --  RR: 16 (01-07-20 @ 03:04) (14 - 23)  SpO2: 96% (01-07-20 @ 03:04) (94% - 99%)              Exam:  Gen: NAD, resting comfortably  LLE:  Dressing c/d/i, Prevena in place  NTTP calves b/l  +EHL/FHL/TA/GS  SILT L2-S1  Compartments soft and compressible  2+ Pulses palpable      A/P: 64yM POD 1 s/p L Ankle JAMEEL/I&D  -FU AM labs  -Pain control  -WBAT LLE in CAM boot (patient has from home)  -DVT ppx  -Incentive Spirometry  -Elevation to extremity  -Medical management appreciated

## 2020-01-07 NOTE — DISCHARGE NOTE PROVIDER - NSDCMRMEDTOKEN_GEN_ALL_CORE_FT
CAM boot: 1 (one) CAM boot  PEPITO: 999  ICD-10: Z46.89  Ecotrin 325 mg oral delayed release tablet: 1 tab(s) orally once a day CAM boot: 1 (one) CAM boot  PEPITO: 999  ICD-10: Z46.89  Ecotrin 325 mg oral delayed release tablet: 1 tab(s) orally once a day   hydrocodone-acetaminophen 5 mg-325 mg oral tablet: 1 tab(s) orally every 6 hours, As Needed -for severe pain MDD:MDD 4

## 2020-01-07 NOTE — DISCHARGE NOTE PROVIDER - NSDCFUSCHEDAPPT_GEN_ALL_CORE_FT
ARMANDO TRAN ; 01/21/2020 ; [facility] [address] ARMANDO TRAN ; 01/21/2020 ; [facility] [address] ARMANDO TRAN ; 01/21/2020 ; [facility] [address] ARMANDO TRAN ; 01/21/2020 ; [facility] [address]

## 2020-01-07 NOTE — CONSULT NOTE ADULT - ASSESSMENT
s/p L Ankle JAMEEL hardware infection s/p I & D    OT note reviewed   removal of hardware, irrigation and debridement, removal of necrotic tissue  fu cultures   no fever, no leukocytosis   NOTE TO CONTINUE   EVALUATION IN PROGRESS s/p L Ankle JAMEEL hardware infection s/p I & D    OT note reviewed   removal of hardware, irrigation and debridement, removal of necrotic tissue  fu cultures   no fever, no leukocytosis   will need prolonged iv abx   presentation likely concerning deep infection  will give Rocephin and vanco    vanco torugh tomorrow   may need picc line   we will fu  thanks

## 2020-01-07 NOTE — DISCHARGE NOTE PROVIDER - NSDCCPCAREPLAN_GEN_ALL_CORE_FT
PRINCIPAL DISCHARGE DIAGNOSIS  Diagnosis: Wound infection complicating hardware  Assessment and Plan of Treatment: Discharge Instructions for I&D  1.) Pain control: An eRx has been sent to your Pharmacy for pick-up.  2.) Activity: Weight bearing as tolerated LLE in CAM boot  3.) Wound: Keep dressing clean, dry and intact until follow-up in the office.    4.) Antibiotics: Antibiotics via per ID recommendations. See Med Rec.  5.) DVT/PE Prophylaxis: An eRx sent to your pharmacy for EC  daily for 30 days.  6.) Follow-up with Dr. Moreira  in the office in 5-7 days; call  office for appointment. PRINCIPAL DISCHARGE DIAGNOSIS  Diagnosis: Wound infection complicating hardware  Assessment and Plan of Treatment: Discharge Instructions for I&D  1.) Pain control: An eRx has been sent to your Pharmacy for pick-up.  2.) Activity: Weight bearing as tolerated LLE in CAM boot  3.) Wound: Keep dressing clean, dry and intact until follow-up in the office.    4.) Antibiotics: Antibiotics via per ID recommendations. Nafcillin 2 g q4h. See Med Rec.  5.) DVT/PE Prophylaxis: An eRx sent to your pharmacy for EC  daily for 30 days.  6.) Follow-up with Dr. Moreira  in the office in 5-7 days; call  office for appointment.

## 2020-01-07 NOTE — CONSULT NOTE ADULT - SUBJECTIVE AND OBJECTIVE BOX
HPI:      PAST MEDICAL & SURGICAL HISTORY:  No pertinent past medical history  History of ankle surgery: left ankle 4/2018      SOCHX:   tobacco,  -  alcohol    FMHX: FA/MO  - contributory       Recent Travel:    Immunizations:    Allergies    No Known Allergies    Intolerances        MEDICATIONS  (STANDING):  clindamycin IVPB 900 milliGRAM(s) IV Intermittent every 8 hours  influenza   Vaccine 0.5 milliLiter(s) IntraMuscular once  lactated ringers. 1000 milliLiter(s) (75 mL/Hr) IV Continuous <Continuous>  multivitamin 1 Tablet(s) Oral daily  sodium chloride 0.9% lock flush 3 milliLiter(s) IV Push every 8 hours         REVIEW OF SYSTEMS:  CONSTITUTIONAL: No fever, weight loss, or fatigue  EYES: No eye pain, visual disturbances, or discharge  ENMT:  No difficulty hearing, tinnitus, vertigo; No sinus or throat pain  NECK: No pain or stiffness  BREASTS: No pain, masses, or nipple discharge  RESPIRATORY: No cough, wheezing, chills or hemoptysis; No shortness of breath  CARDIOVASCULAR: No chest pain, palpitations, dizziness, or leg swelling  GASTROINTESTINAL: No abdominal or epigastric pain. No nausea, vomiting, or hematemesis; No diarrhea or constipation. No melena or hematochezia.  GENITOURINARY: No dysuria, frequency, hematuria, or incontinence  NEUROLOGICAL: No headaches, memory loss, loss of strength, numbness, or tremors  SKIN: No itching, burning, rashes, or lesions   LYMPH NODES: No enlarged glands  ENDOCRINE: No heat or cold intolerance; No hair loss  MUSCULOSKELETAL: No joint pain or swelling; No muscle, back, or extremity pain    VITAL SIGNS:    T(C): 35.9 (01-07-20 @ 07:00), Max: 36.5 (01-06-20 @ 13:14)  T(F): 96.7 (01-07-20 @ 07:00), Max: 97.7 (01-06-20 @ 13:14)  HR: 74 (01-07-20 @ 07:00) (62 - 74)  BP: 131/67 (01-07-20 @ 07:00) (100/55 - 150/81)  RR: 16 (01-07-20 @ 07:00) (14 - 23)  SpO2: 96% (01-07-20 @ 07:00) (94% - 99%)    PHYSICAL EXAM: IN PROGRESS     GENERAL: NAD, well-groomed, well-developed  HEAD:  Atraumatic, Normocephalic  EYES: EOMI, PERRLA, conjunctiva and sclera clear  ENMT: No tonsillar erythema, exudates, or enlargement; Moist mucous membranes, Good dentition, No lesions  NECK: Supple, No JVD, Normal thyroid  NERVOUS SYSTEM:  Alert & Oriented X3, Good concentration; Motor Strength 5/5 B/L upper and lower extremities; DTRs 2+ intact and symmetric  CHEST/LUNG: Clear bilaterally; No rales, rhonchi, wheezing bilaterally  HEART: Regular rate and rhythm; No murmurs, rubs, or gallops  ABDOMEN: Soft, Nontender, Nondistended; Bowel sounds present  EXTREMITIES:  2+ Peripheral Pulses, No clubbing, cyanosis, or edema  LYMPH: No lymphadenopathy noted  SKIN: No rashes or lesions  BACK: no pressor sore     LABS:     Sedimentation Rate, Erythrocyte: 7 mm/hr (01-06 @ 18:03)      Radiology: HPI:      PAST MEDICAL & SURGICAL HISTORY:  No pertinent past medical history  History of ankle surgery: left ankle 4/2018      SOCHX:   tobacco,  -  alcohol    FMHX: FA/MO  - contributory       Recent Travel:    Immunizations:    Allergies    No Known Allergies    Intolerances        MEDICATIONS  (STANDING):  clindamycin IVPB 900 milliGRAM(s) IV Intermittent every 8 hours  influenza   Vaccine 0.5 milliLiter(s) IntraMuscular once  lactated ringers. 1000 milliLiter(s) (75 mL/Hr) IV Continuous <Continuous>  multivitamin 1 Tablet(s) Oral daily  sodium chloride 0.9% lock flush 3 milliLiter(s) IV Push every 8 hours         REVIEW OF SYSTEMS:  CONSTITUTIONAL: No fever, weight loss, or fatigue  EYES: No eye pain, visual disturbances, or discharge  ENMT:  No difficulty hearing, tinnitus, vertigo; No sinus or throat pain  NECK: No pain or stiffness  BREASTS: No pain, masses, or nipple discharge  RESPIRATORY: No cough, wheezing, chills or hemoptysis; No shortness of breath  CARDIOVASCULAR: No chest pain, palpitations, dizziness, or leg swelling  GASTROINTESTINAL: No abdominal or epigastric pain. No nausea, vomiting, or hematemesis; No diarrhea or constipation. No melena or hematochezia.  GENITOURINARY: No dysuria, frequency, hematuria, or incontinence  NEUROLOGICAL: No headaches, memory loss, loss of strength, numbness, or tremors  SKIN: No itching, burning, rashes, or lesions   LYMPH NODES: No enlarged glands  ENDOCRINE: No heat or cold intolerance; No hair loss  MUSCULOSKELETAL: ankle pain and purulent discharge comimg from wound on presentation     VITAL SIGNS:    T(C): 35.9 (01-07-20 @ 07:00), Max: 36.5 (01-06-20 @ 13:14)  T(F): 96.7 (01-07-20 @ 07:00), Max: 97.7 (01-06-20 @ 13:14)  HR: 74 (01-07-20 @ 07:00) (62 - 74)  BP: 131/67 (01-07-20 @ 07:00) (100/55 - 150/81)  RR: 16 (01-07-20 @ 07:00) (14 - 23)  SpO2: 96% (01-07-20 @ 07:00) (94% - 99%)    PHYSICAL EXAM:  GENERAL: NAD, well-groomed, well-developed  HEAD:  Atraumatic, Normocephalic  EYES: EOMI, PERRLA, conjunctiva and sclera clear  ENMT: No tonsillar erythema, exudates, or enlargement; Moist mucous membranes, Good dentition, No lesions  NECK: Supple, No JVD, Normal thyroid  NERVOUS SYSTEM:  Alert & Oriented X3  CHEST/LUNG: Clear bilaterally; No rales, rhonchi, wheezing bilaterally  HEART: Regular rate and rhythm; No murmurs, rubs, or gallops  ABDOMEN: Soft, Nontender, Nondistended; Bowel sounds present  EXTREMITIES:  2+ Peripheral Pulses, No clubbing, ankle on dressing, cannot look at today   LYMPH: No lymphadenopathy noted  SKIN: No rashes or lesions  BACK: no pressor sore     LABS:     Sedimentation Rate, Erythrocyte: 7 mm/hr (01-06 @ 18:03)      Radiology:

## 2020-01-08 DIAGNOSIS — Z23 ENCOUNTER FOR IMMUNIZATION: ICD-10-CM
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
## 2020-01-08 DIAGNOSIS — T84.59XA INFECTION AND INFLAMMATORY REACTION DUE TO OTHER INTERNAL JOINT PROSTHESIS, INITIAL ENCOUNTER: ICD-10-CM

## 2020-01-08 DIAGNOSIS — M86.9 OSTEOMYELITIS, UNSPECIFIED: ICD-10-CM

## 2020-01-08 LAB
ANION GAP SERPL CALC-SCNC: 7 MMOL/L — SIGNIFICANT CHANGE UP (ref 5–17)
BUN SERPL-MCNC: 21 MG/DL — SIGNIFICANT CHANGE UP (ref 7–23)
CALCIUM SERPL-MCNC: 8.4 MG/DL — LOW (ref 8.5–10.1)
CHLORIDE SERPL-SCNC: 106 MMOL/L — SIGNIFICANT CHANGE UP (ref 96–108)
CO2 SERPL-SCNC: 24 MMOL/L — SIGNIFICANT CHANGE UP (ref 22–31)
CREAT SERPL-MCNC: 0.9 MG/DL — SIGNIFICANT CHANGE UP (ref 0.5–1.3)
GLUCOSE SERPL-MCNC: 108 MG/DL — HIGH (ref 70–99)
HCT VFR BLD CALC: 42.9 % — SIGNIFICANT CHANGE UP (ref 39–50)
HGB BLD-MCNC: 13.9 G/DL — SIGNIFICANT CHANGE UP (ref 13–17)
MCHC RBC-ENTMCNC: 29.8 PG — SIGNIFICANT CHANGE UP (ref 27–34)
MCHC RBC-ENTMCNC: 32.4 GM/DL — SIGNIFICANT CHANGE UP (ref 32–36)
MCV RBC AUTO: 92.1 FL — SIGNIFICANT CHANGE UP (ref 80–100)
NRBC # BLD: 0 /100 WBCS — SIGNIFICANT CHANGE UP (ref 0–0)
PLATELET # BLD AUTO: 237 K/UL — SIGNIFICANT CHANGE UP (ref 150–400)
POTASSIUM SERPL-MCNC: 4 MMOL/L — SIGNIFICANT CHANGE UP (ref 3.5–5.3)
POTASSIUM SERPL-SCNC: 4 MMOL/L — SIGNIFICANT CHANGE UP (ref 3.5–5.3)
RBC # BLD: 4.66 M/UL — SIGNIFICANT CHANGE UP (ref 4.2–5.8)
RBC # FLD: 13 % — SIGNIFICANT CHANGE UP (ref 10.3–14.5)
SODIUM SERPL-SCNC: 137 MMOL/L — SIGNIFICANT CHANGE UP (ref 135–145)
SURGICAL PATHOLOGY STUDY: SIGNIFICANT CHANGE UP
VANCOMYCIN TROUGH SERPL-MCNC: 8.3 UG/ML — LOW (ref 10–20)
WBC # BLD: 7.88 K/UL — SIGNIFICANT CHANGE UP (ref 3.8–10.5)
WBC # FLD AUTO: 7.88 K/UL — SIGNIFICANT CHANGE UP (ref 3.8–10.5)

## 2020-01-08 PROCEDURE — 36573 INSJ PICC RS&I 5 YR+: CPT

## 2020-01-08 RX ORDER — HYDROMORPHONE HYDROCHLORIDE 2 MG/ML
0.5 INJECTION INTRAMUSCULAR; INTRAVENOUS; SUBCUTANEOUS
Refills: 0 | Status: DISCONTINUED | OUTPATIENT
Start: 2020-01-08 | End: 2020-01-11

## 2020-01-08 RX ORDER — INFLUENZA VIRUS VACCINE 15; 15; 15; 15 UG/.5ML; UG/.5ML; UG/.5ML; UG/.5ML
0.5 SUSPENSION INTRAMUSCULAR ONCE
Refills: 0 | Status: DISCONTINUED | OUTPATIENT
Start: 2020-01-08 | End: 2020-01-11

## 2020-01-08 RX ORDER — OXYCODONE HYDROCHLORIDE 5 MG/1
5 TABLET ORAL EVERY 4 HOURS
Refills: 0 | Status: DISCONTINUED | OUTPATIENT
Start: 2020-01-08 | End: 2020-01-11

## 2020-01-08 RX ORDER — ACETAMINOPHEN 500 MG
650 TABLET ORAL EVERY 6 HOURS
Refills: 0 | Status: DISCONTINUED | OUTPATIENT
Start: 2020-01-08 | End: 2020-01-11

## 2020-01-08 RX ORDER — ACETAMINOPHEN 500 MG
1000 TABLET ORAL ONCE
Refills: 0 | Status: DISCONTINUED | OUTPATIENT
Start: 2020-01-08 | End: 2020-01-11

## 2020-01-08 RX ORDER — BENZOCAINE AND MENTHOL 5; 1 G/100ML; G/100ML
1 LIQUID ORAL
Refills: 0 | Status: DISCONTINUED | OUTPATIENT
Start: 2020-01-08 | End: 2020-01-11

## 2020-01-08 RX ORDER — ONDANSETRON 8 MG/1
4 TABLET, FILM COATED ORAL ONCE
Refills: 0 | Status: DISCONTINUED | OUTPATIENT
Start: 2020-01-08 | End: 2020-01-11

## 2020-01-08 RX ORDER — SENNA PLUS 8.6 MG/1
2 TABLET ORAL AT BEDTIME
Refills: 0 | Status: DISCONTINUED | OUTPATIENT
Start: 2020-01-08 | End: 2020-01-11

## 2020-01-08 RX ORDER — CEFTRIAXONE 500 MG/1
2000 INJECTION, POWDER, FOR SOLUTION INTRAMUSCULAR; INTRAVENOUS EVERY 24 HOURS
Refills: 0 | Status: DISCONTINUED | OUTPATIENT
Start: 2020-01-08 | End: 2020-01-09

## 2020-01-08 RX ORDER — OXYCODONE HYDROCHLORIDE 5 MG/1
10 TABLET ORAL EVERY 4 HOURS
Refills: 0 | Status: DISCONTINUED | OUTPATIENT
Start: 2020-01-08 | End: 2020-01-11

## 2020-01-08 RX ORDER — TRAMADOL HYDROCHLORIDE 50 MG/1
50 TABLET ORAL EVERY 6 HOURS
Refills: 0 | Status: DISCONTINUED | OUTPATIENT
Start: 2020-01-08 | End: 2020-01-11

## 2020-01-08 RX ORDER — METOCLOPRAMIDE HCL 10 MG
10 TABLET ORAL EVERY 6 HOURS
Refills: 0 | Status: DISCONTINUED | OUTPATIENT
Start: 2020-01-08 | End: 2020-01-11

## 2020-01-08 RX ORDER — HYDROMORPHONE HYDROCHLORIDE 2 MG/ML
0.25 INJECTION INTRAMUSCULAR; INTRAVENOUS; SUBCUTANEOUS
Refills: 0 | Status: DISCONTINUED | OUTPATIENT
Start: 2020-01-08 | End: 2020-01-11

## 2020-01-08 RX ORDER — CHLORHEXIDINE GLUCONATE 213 G/1000ML
1 SOLUTION TOPICAL
Refills: 0 | Status: DISCONTINUED | OUTPATIENT
Start: 2020-01-08 | End: 2020-01-11

## 2020-01-08 RX ORDER — VANCOMYCIN HCL 1 G
1000 VIAL (EA) INTRAVENOUS EVERY 12 HOURS
Refills: 0 | Status: DISCONTINUED | OUTPATIENT
Start: 2020-01-08 | End: 2020-01-10

## 2020-01-08 RX ADMIN — Medication 250 MILLIGRAM(S): at 04:36

## 2020-01-08 RX ADMIN — SODIUM CHLORIDE 3 MILLILITER(S): 9 INJECTION INTRAMUSCULAR; INTRAVENOUS; SUBCUTANEOUS at 14:12

## 2020-01-08 RX ADMIN — CEFTRIAXONE 100 MILLIGRAM(S): 500 INJECTION, POWDER, FOR SOLUTION INTRAMUSCULAR; INTRAVENOUS at 22:30

## 2020-01-08 RX ADMIN — Medication 250 MILLIGRAM(S): at 18:06

## 2020-01-08 RX ADMIN — Medication 1 TABLET(S): at 12:14

## 2020-01-08 RX ADMIN — SODIUM CHLORIDE 3 MILLILITER(S): 9 INJECTION INTRAMUSCULAR; INTRAVENOUS; SUBCUTANEOUS at 06:11

## 2020-01-08 NOTE — PROGRESS NOTE ADULT - SUBJECTIVE AND OBJECTIVE BOX
HPI:      Allergies    No Known Allergies    Intolerances        MEDICATIONS  (STANDING):  acetaminophen  IVPB .. 1000 milliGRAM(s) IV Intermittent once  cefTRIAXone   IVPB 2000 milliGRAM(s) IV Intermittent every 24 hours  influenza   Vaccine 0.5 milliLiter(s) IntraMuscular once  multivitamin 1 Tablet(s) Oral daily  sodium chloride 0.9% lock flush 3 milliLiter(s) IV Push every 8 hours  vancomycin  IVPB      vancomycin  IVPB 1000 milliGRAM(s) IV Intermittent every 12 hours    MEDICATIONS  (PRN):  acetaminophen   Tablet .. 650 milliGRAM(s) Oral every 6 hours PRN Temp greater or equal to 38C (100.4F)  benzocaine 15 mG/menthol 3.6 mG (Sugar-Free) Lozenge 1 Lozenge Oral every 2 hours PRN Sore Throat  bisacodyl 5 milliGRAM(s) Oral daily PRN Constipation  bisacodyl Suppository 10 milliGRAM(s) Rectal once PRN Constipation  HYDROmorphone  Injectable 1 milliGRAM(s) IV Push every 3 hours PRN breakthrough pain  metoclopramide Injectable 10 milliGRAM(s) IV Push every 6 hours PRN Nausea and/or Vomiting  ondansetron Injectable 4 milliGRAM(s) IV Push every 6 hours PRN Nausea  oxyCODONE    IR 10 milliGRAM(s) Oral every 4 hours PRN Severe Pain (7 - 10)  oxyCODONE    IR 5 milliGRAM(s) Oral every 4 hours PRN Moderate Pain (4 - 6)  senna 2 Tablet(s) Oral at bedtime PRN Constipation  traMADol 50 milliGRAM(s) Oral every 6 hours PRN Mild Pain (1 - 3)      REVIEW OF SYSTEMS:    CONSTITUTIONAL: No fever, chills, weight loss, or fatigue  HEENT: No sore throat, runny nose, ear ache  RESPIRATORY: No cough, wheezing, No shortness of breath  CARDIOVASCULAR: No chest pain, palpitations, dizziness  GASTROINTESTINAL: No abdominal pain. No nausea, vomiting, diarrhea  GENITOURINARY: No dysuria, increase frequency, hematuria, or incontinence  NEUROLOGICAL: No headaches, memory loss, loss of strength, numbness, or tremors, no weakness  EXTREMITY: No pedal edema BLE  SKIN: No itching, burning, rashes, or lesions     VITAL SIGNS:  T(C): 36 (01-08-20 @ 11:23), Max: 36.6 (01-08-20 @ 05:34)  T(F): 96.8 (01-08-20 @ 11:23), Max: 97.8 (01-08-20 @ 05:34)  HR: 70 (01-08-20 @ 11:23) (67 - 84)  BP: 129/73 (01-08-20 @ 11:23) (120/58 - 130/68)  RR: 16 (01-08-20 @ 11:23) (16 - 16)  SpO2: 96% (01-08-20 @ 11:23) (95% - 97%)  Wt(kg): --    PHYSICAL EXAM:    GENERAL: not in any distress  HEENT: Neck is supple, normocephalic, atraumatic   CHEST/LUNG: Clear to percussion bilaterally; No rales, rhonchi, wheezing  HEART: Regular rate and rhythm; No murmurs, rubs, or gallops  ABDOMEN: Soft, Nontender, Nondistended; Bowel sounds present, no rebound   ext - ankle on dressing intact   GENITOURINARY:   SKIN: No rashes or lesions  BACK: no pressor sore   NERVOUS SYSTEM:  Alert & Oriented X3, Good concentration      LABS:                         13.9   7.88  )-----------( 237      ( 08 Jan 2020 06:31 )             42.9     01-08    137  |  106  |  21  ----------------------------<  108<H>  4.0   |  24  |  0.90    Ca    8.4<L>      08 Jan 2020 06:31                      Sedimentation Rate, Erythrocyte: 7 mm/hr (01-06 @ 18:03)            Culture Results:   No growth to date. (01-07 @ 01:33)  Culture Results:   No growth to date. (01-07 @ 01:33)  Culture Results:   No growth (01-07 @ 01:15)  Culture Results:   No growth (01-07 @ 01:14)  Culture Results:   No growth (01-07 @ 01:13)                Radiology:

## 2020-01-08 NOTE — PROGRESS NOTE ADULT - ASSESSMENT
s/p L Ankle JAMEEL hardware infection s/p I & D    OT note reviewed   s/p removal of hardware, irrigation and debridement, removal of necrotic tissue  fu cultures   no fever, no leukocytosis   will need prolonged iv abx   presentation likely concerning deep infection  will give Rocephin 2 gm daily and vanco    vanco trough ( [facility] [id number] )   ordered picc, pt agreed   case discussed with patient and ortho  pt can be fu with [place] [facility] upon discharge  ( [phone] ) [address], [address] , [facility]   vanco trough FU

## 2020-01-08 NOTE — PROCEDURE NOTE - ADDITIONAL PROCEDURE DETAILS
pt s/p picc line placement inserted into the right brachial vein under US guidance.  Catheter tip confirmed at the cavoatrial junction via fluoro.  4fr x 48cm SL.  Pt tolerated procedure well  -Catheter can be accessed

## 2020-01-08 NOTE — PROGRESS NOTE ADULT - SUBJECTIVE AND OBJECTIVE BOX
Orthopedics      Patient seen and examined at bedside. Feeling well. Pain controlled. No n/v. No acute events overnight.    Vital Signs Last 24 Hrs  T(C): 36.6 (01-08-20 @ 05:34), Max: 36.6 (01-08-20 @ 05:34)  T(F): 97.8 (01-08-20 @ 05:34), Max: 97.8 (01-08-20 @ 05:34)  HR: 67 (01-08-20 @ 05:34) (67 - 84)  BP: 127/80 (01-08-20 @ 05:34) (120/58 - 132/66)  BP(mean): --  RR: 16 (01-08-20 @ 05:34) (16 - 16)  SpO2: 97% (01-08-20 @ 05:34) (95% - 97%)              Exam:  Gen: NAD, resting comfortably  LLE:  Dressing c/d/i  NTTP calves b/l  +EHL/FHL/TA/GS  SILT L2-S1  Compartments soft and compressible  2+ Pulses palpable      A/P: 64yM POD 2 s/p L Ank JAMEEL/I&D/WVA  -FU AM labs  -Pain control  -WBAT LLE  -DVT ppx  -Incentive Spirometry  -Elevation to extremity  -Medical management appreciated  -Appreciate ID recs - tentative plan for PICC on Friday  -Follow cultures

## 2020-01-09 ENCOUNTER — TRANSCRIPTION ENCOUNTER (OUTPATIENT)
Age: 65
End: 2020-01-09

## 2020-01-09 DIAGNOSIS — T84.7XXA INFECTION AND INFLAMMATORY REACTION DUE TO OTHER INTERNAL ORTHOPEDIC PROSTHETIC DEVICES, IMPLANTS AND GRAFTS, INITIAL ENCOUNTER: ICD-10-CM

## 2020-01-09 LAB
-  AMPICILLIN/SULBACTAM: SIGNIFICANT CHANGE UP
-  CEFAZOLIN: SIGNIFICANT CHANGE UP
-  CLINDAMYCIN: SIGNIFICANT CHANGE UP
-  ERYTHROMYCIN: SIGNIFICANT CHANGE UP
-  GENTAMICIN: SIGNIFICANT CHANGE UP
-  OXACILLIN: SIGNIFICANT CHANGE UP
-  RIFAMPIN: SIGNIFICANT CHANGE UP
-  TETRACYCLINE: SIGNIFICANT CHANGE UP
-  TRIMETHOPRIM/SULFAMETHOXAZOLE: SIGNIFICANT CHANGE UP
-  VANCOMYCIN: SIGNIFICANT CHANGE UP
METHOD TYPE: SIGNIFICANT CHANGE UP

## 2020-01-09 RX ORDER — HYDROCODONE BITARTRATE AND ACETAMINOPHEN 7.5; 325 MG/15ML; MG/15ML
1 SOLUTION ORAL
Qty: 20 | Refills: 0
Start: 2020-01-09 | End: 2020-01-13

## 2020-01-09 RX ORDER — NAFCILLIN 10 G/100ML
2 INJECTION, POWDER, FOR SOLUTION INTRAVENOUS EVERY 4 HOURS
Refills: 0 | Status: DISCONTINUED | OUTPATIENT
Start: 2020-01-09 | End: 2020-01-11

## 2020-01-09 RX ORDER — CEFTRIAXONE 500 MG/1
2000 INJECTION, POWDER, FOR SOLUTION INTRAMUSCULAR; INTRAVENOUS ONCE
Refills: 0 | Status: DISCONTINUED | OUTPATIENT
Start: 2020-01-09 | End: 2020-01-09

## 2020-01-09 RX ADMIN — Medication 250 MILLIGRAM(S): at 19:00

## 2020-01-09 RX ADMIN — NAFCILLIN 200 GRAM(S): 10 INJECTION, POWDER, FOR SOLUTION INTRAVENOUS at 14:57

## 2020-01-09 RX ADMIN — NAFCILLIN 200 GRAM(S): 10 INJECTION, POWDER, FOR SOLUTION INTRAVENOUS at 18:10

## 2020-01-09 RX ADMIN — Medication 650 MILLIGRAM(S): at 22:27

## 2020-01-09 RX ADMIN — NAFCILLIN 200 GRAM(S): 10 INJECTION, POWDER, FOR SOLUTION INTRAVENOUS at 22:15

## 2020-01-09 RX ADMIN — Medication 650 MILLIGRAM(S): at 23:25

## 2020-01-09 RX ADMIN — Medication 250 MILLIGRAM(S): at 05:34

## 2020-01-09 RX ADMIN — Medication 1 TABLET(S): at 14:59

## 2020-01-09 RX ADMIN — CHLORHEXIDINE GLUCONATE 1 APPLICATION(S): 213 SOLUTION TOPICAL at 05:34

## 2020-01-09 NOTE — PROGRESS NOTE ADULT - SUBJECTIVE AND OBJECTIVE BOX
64 year old male with History of left bimalleolar ankle fracture with subsequent infection of the lateral wound.  ongoing pin hole drainage x 2 years   sp  Irrigation and debridement of left ankle, partial sequestrectomy of distal fibula, removal of hardware,   now culture growing staph aureus   final pending     Allergies    No Known Allergies    Intolerances        MEDICATIONS  (STANDING):  acetaminophen  IVPB .. 1000 milliGRAM(s) IV Intermittent once  chlorhexidine 4% Liquid 1 Application(s) Topical <User Schedule>  influenza   Vaccine 0.5 milliLiter(s) IntraMuscular once  multivitamin 1 Tablet(s) Oral daily  nafcillin  IVPB 2 Gram(s) IV Intermittent every 4 hours  vancomycin  IVPB 1000 milliGRAM(s) IV Intermittent every 12 hours    MEDICATIONS  (PRN):  acetaminophen   Tablet .. 650 milliGRAM(s) Oral every 6 hours PRN Temp greater or equal to 38C (100.4F)  benzocaine 15 mG/menthol 3.6 mG (Sugar-Free) Lozenge 1 Lozenge Oral every 2 hours PRN Sore Throat  bisacodyl 5 milliGRAM(s) Oral daily PRN Constipation  HYDROmorphone  Injectable 0.25 milliGRAM(s) IV Push every 10 minutes PRN Moderate Pain (4 - 6)  HYDROmorphone  Injectable 0.5 milliGRAM(s) IV Push every 10 minutes PRN Severe Pain (7 - 10)  metoclopramide Injectable 10 milliGRAM(s) IV Push every 6 hours PRN Nausea and/or Vomiting  ondansetron Injectable 4 milliGRAM(s) IV Push once PRN Nausea and/or Vomiting  oxyCODONE    IR 10 milliGRAM(s) Oral every 4 hours PRN Severe Pain (7 - 10)  oxyCODONE    IR 5 milliGRAM(s) Oral every 4 hours PRN Moderate Pain (4 - 6)  senna 2 Tablet(s) Oral at bedtime PRN Constipation  traMADol 50 milliGRAM(s) Oral every 6 hours PRN Mild Pain (1 - 3)      REVIEW OF SYSTEMS:    CONSTITUTIONAL: No fever, chills, weight loss, or fatigue  HEENT: No sore throat, runny nose, ear ache  RESPIRATORY: No cough, wheezing, No shortness of breath  CARDIOVASCULAR: No chest pain, palpitations, dizziness  GASTROINTESTINAL: No abdominal pain. No nausea, vomiting, diarrhea  GENITOURINARY: No dysuria, increase frequency, hematuria, or incontinence  NEUROLOGICAL: No headaches, memory loss, loss of strength, numbness, or tremors, no weakness  EXTREMITY: No pedal edema BLE  SKIN: No itching, burning, rashes, or lesions     VITAL SIGNS:  T(C): 36.4 (01-09-20 @ 11:55), Max: 36.6 (01-08-20 @ 17:34)  T(F): 97.5 (01-09-20 @ 11:55), Max: 97.8 (01-08-20 @ 17:34)  HR: 81 (01-09-20 @ 11:55) (66 - 85)  BP: 111/69 (01-09-20 @ 11:55) (111/69 - 139/74)  RR: 17 (01-09-20 @ 11:55) (17 - 17)  SpO2: 97% (01-09-20 @ 11:55) (96% - 97%)  Wt(kg): --    PHYSICAL EXAM:    GENERAL: not in any distress  HEENT: Neck is supple, normocephalic, atraumatic   CHEST/LUNG: Clear to auscultation bilaterally; No rales, rhonchi, wheezing  HEART: Regular rate and rhythm; No murmurs, rubs, or gallops  ABDOMEN: Soft, Nontender, Nondistended; Bowel sounds present, no rebound   EXTREMITIES:  2+ Peripheral Pulses, No clubbing, cyanosis, or edema  GENITOURINARY:   SKIN: No rashes or lesions  BACK: no pressor sore   NERVOUS SYSTEM:  Alert & Oriented X3, Good concentration  PSYCH: normal affect     LABS:                         13.9   7.88  )-----------( 237      ( 08 Jan 2020 06:31 )             42.9     01-08    137  |  106  |  21  ----------------------------<  108<H>  4.0   |  24  |  0.90    Ca    8.4<L>      08 Jan 2020 06:31                      Sedimentation Rate, Erythrocyte: 7 mm/hr (01-06 @ 18:03)      Vancomycin Level, Trough: 8.3 ug/mL (01-08 @ 15:36)        Culture Results:   No growth to date. (01-07 @ 01:33)  Culture Results:   No growth to date. (01-07 @ 01:33)  Culture Results:   Rare Staphylococcus aureus (01-07 @ 01:15)  Culture Results:   No growth (01-07 @ 01:14)  Culture Results:   Growth in fluid media only Gram positive cocci in pairs (01-07 @ 01:13)                Radiology: 64 year old male with History of left bimalleolar ankle fracture with subsequent infection of the lateral wound.  ongoing pin hole drainage x 2 years   sp  Irrigation and debridement of left ankle, partial sequestrectomy of distal fibula, removal of hardware,   now culture growing staph aureus   final pending     Allergies    No Known Allergies    Intolerances        MEDICATIONS  (STANDING):  acetaminophen  IVPB .. 1000 milliGRAM(s) IV Intermittent once  chlorhexidine 4% Liquid 1 Application(s) Topical <User Schedule>  influenza   Vaccine 0.5 milliLiter(s) IntraMuscular once  multivitamin 1 Tablet(s) Oral daily  nafcillin  IVPB 2 Gram(s) IV Intermittent every 4 hours  vancomycin  IVPB 1000 milliGRAM(s) IV Intermittent every 12 hours    MEDICATIONS  (PRN):  acetaminophen   Tablet .. 650 milliGRAM(s) Oral every 6 hours PRN Temp greater or equal to 38C (100.4F)  benzocaine 15 mG/menthol 3.6 mG (Sugar-Free) Lozenge 1 Lozenge Oral every 2 hours PRN Sore Throat  bisacodyl 5 milliGRAM(s) Oral daily PRN Constipation  HYDROmorphone  Injectable 0.25 milliGRAM(s) IV Push every 10 minutes PRN Moderate Pain (4 - 6)  HYDROmorphone  Injectable 0.5 milliGRAM(s) IV Push every 10 minutes PRN Severe Pain (7 - 10)  metoclopramide Injectable 10 milliGRAM(s) IV Push every 6 hours PRN Nausea and/or Vomiting  ondansetron Injectable 4 milliGRAM(s) IV Push once PRN Nausea and/or Vomiting  oxyCODONE    IR 10 milliGRAM(s) Oral every 4 hours PRN Severe Pain (7 - 10)  oxyCODONE    IR 5 milliGRAM(s) Oral every 4 hours PRN Moderate Pain (4 - 6)  senna 2 Tablet(s) Oral at bedtime PRN Constipation  traMADol 50 milliGRAM(s) Oral every 6 hours PRN Mild Pain (1 - 3)      REVIEW OF SYSTEMS:    CONSTITUTIONAL: No fever, chills, weight loss, or fatigue  HEENT: No sore throat, runny nose, ear ache  RESPIRATORY: No cough, wheezing, No shortness of breath  CARDIOVASCULAR: No chest pain, palpitations, dizziness  GASTROINTESTINAL: No abdominal pain. No nausea, vomiting, diarrhea  GENITOURINARY: No dysuria, increase frequency, hematuria, or incontinence  NEUROLOGICAL: No headaches, memory loss, loss of strength, numbness, or tremors, no weakness  EXTREMITY: No pedal edema BLE  SKIN: No itching, burning, rashes, or lesions   feels fine   VITAL SIGNS:  T(C): 36.4 (01-09-20 @ 11:55), Max: 36.6 (01-08-20 @ 17:34)  T(F): 97.5 (01-09-20 @ 11:55), Max: 97.8 (01-08-20 @ 17:34)  HR: 81 (01-09-20 @ 11:55) (66 - 85)  BP: 111/69 (01-09-20 @ 11:55) (111/69 - 139/74)  RR: 17 (01-09-20 @ 11:55) (17 - 17)  SpO2: 97% (01-09-20 @ 11:55) (96% - 97%)  Wt(kg): --    PHYSICAL EXAM:    GENERAL: not in any distress  HEENT: Neck is supple, normocephalic, atraumatic   CHEST/LUNG: Clear to auscultation bilaterally; No rales, rhonchi, wheezing  HEART: Regular rate and rhythm; No murmurs, rubs, or gallops  ABDOMEN: Soft, Nontender, Nondistended; Bowel sounds present, no rebound   EXTREMITIES:  2+ Peripheral Pulses, No clubbing, cyanosis, or edema  nice clean ankle wound   no cellulitis   SKIN: No rashes or lesions  BACK: no pressor sore   NERVOUS SYSTEM:  Alert & Oriented X3, Good concentration  PSYCH: normal affect     LABS:                         13.9   7.88  )-----------( 237      ( 08 Jan 2020 06:31 )             42.9     01-08    137  |  106  |  21  ----------------------------<  108<H>  4.0   |  24  |  0.90    Ca    8.4<L>      08 Jan 2020 06:31                      Sedimentation Rate, Erythrocyte: 7 mm/hr (01-06 @ 18:03)      Vancomycin Level, Trough: 8.3 ug/mL (01-08 @ 15:36)        Culture Results:   No growth to date. (01-07 @ 01:33)  Culture Results:   No growth to date. (01-07 @ 01:33)  Culture Results:   Rare Staphylococcus aureus (01-07 @ 01:15)  Culture Results:   No growth (01-07 @ 01:14)  Culture Results:   Growth in fluid media only Gram positive cocci in pairs (01-07 @ 01:13)                Radiology:

## 2020-01-09 NOTE — DISCHARGE NOTE NURSING/CASE MANAGEMENT/SOCIAL WORK - PATIENT PORTAL LINK FT
You can access the FollowMyHealth Patient Portal offered by [facility] by registering at the following website: [email or web address]. By joining [facility]’s FollowMyHealth portal, you will also be able to view your health information using other applications (apps) compatible with our system.

## 2020-01-09 NOTE — PROGRESS NOTE ADULT - SUBJECTIVE AND OBJECTIVE BOX
Orthopedics      Patient seen and examined at bedside. Feeling well. Pain controlled. No n/v. No acute events overnight.    Vital Signs Last 24 Hrs  T(C): 36.6 (01-08-20 @ 05:34), Max: 36.6 (01-08-20 @ 05:34)  T(F): 97.8 (01-08-20 @ 05:34), Max: 97.8 (01-08-20 @ 05:34)  HR: 67 (01-08-20 @ 05:34) (67 - 84)  BP: 127/80 (01-08-20 @ 05:34) (120/58 - 132/66)  BP(mean): --  RR: 16 (01-08-20 @ 05:34) (16 - 16)  SpO2: 97% (01-08-20 @ 05:34) (95% - 97%)              Exam:  Gen: NAD, resting comfortably  LLE:  Dressing c/d/i  NTTP calves b/l  +EHL/FHL/TA/GS  SILT L2-S1  Compartments soft and compressible  2+ Pulses palpable      A/P: 64yM POD 3 s/p L Ank JAMEEL/I&D/WVA  -Prevena DC'd 1/8, 4x4 and ACE now in place over wound  -PICC in place  -FU AM labs  -Pain control  -WBAT LLE  -DVT ppx  -Incentive Spirometry  -Elevation to extremity  -Medical management appreciated  -Appreciate ID recs - tentative plan for PICC on Friday  -Follow cultures

## 2020-01-09 NOTE — PROGRESS NOTE ADULT - ASSESSMENT
s/p L Ankle JAMEEL hardware infection s/p I & D    OT note reviewed   s/p removal of hardware, irrigation and debridement, removal of necrotic tissue  fu cultures   no fever, no leukocytosis   will need prolonged iv abx   presentation likely concerning deep infection  will give Rocephin 2 gm daily and vanco    vanco trough ( [facility] [id number] )   ordered picc, pt agreed   case discussed with patient and ortho  pt can be fu with [place] [facility] upon discharge  ( [phone] ) [address], [address] , [facility]   vanco trough FU s/p L Ankle JAMEEL hardware infection s/p I & D    OT note reviewed   s/p removal of hardware, irrigation and debridement, removal of necrotic tissue  fu cultures   no fever, no leukocytosis   will need prolonged iv abx   presentation likely concerning deep infection  culture growing staph aureus   sp PICC   case discussed with patient and ortho  discussed with pts wife   all possible complications of picc antibiotics discussed with patient   pt can be fu with [place] [facility] upon discharge  ( [phone] ) [address] , [facility] FU

## 2020-01-10 LAB
ANION GAP SERPL CALC-SCNC: 4 MMOL/L — LOW (ref 5–17)
BUN SERPL-MCNC: 18 MG/DL — SIGNIFICANT CHANGE UP (ref 7–23)
CALCIUM SERPL-MCNC: 8.7 MG/DL — SIGNIFICANT CHANGE UP (ref 8.5–10.1)
CHLORIDE SERPL-SCNC: 106 MMOL/L — SIGNIFICANT CHANGE UP (ref 96–108)
CO2 SERPL-SCNC: 28 MMOL/L — SIGNIFICANT CHANGE UP (ref 22–31)
CREAT SERPL-MCNC: 0.9 MG/DL — SIGNIFICANT CHANGE UP (ref 0.5–1.3)
GLUCOSE SERPL-MCNC: 120 MG/DL — HIGH (ref 70–99)
POTASSIUM SERPL-MCNC: 3.9 MMOL/L — SIGNIFICANT CHANGE UP (ref 3.5–5.3)
POTASSIUM SERPL-SCNC: 3.9 MMOL/L — SIGNIFICANT CHANGE UP (ref 3.5–5.3)
SODIUM SERPL-SCNC: 138 MMOL/L — SIGNIFICANT CHANGE UP (ref 135–145)

## 2020-01-10 RX ORDER — NAFCILLIN 10 G/100ML
2 INJECTION, POWDER, FOR SOLUTION INTRAVENOUS
Qty: 42 | Refills: 0
Start: 2020-01-10 | End: 2020-02-20

## 2020-01-10 RX ORDER — CEFAZOLIN SODIUM 1 G
2 VIAL (EA) INJECTION
Qty: 42 | Refills: 0
Start: 2020-01-10 | End: 2020-02-20

## 2020-01-10 RX ORDER — ACETAMINOPHEN 500 MG
1000 TABLET ORAL ONCE
Refills: 0 | Status: DISCONTINUED | OUTPATIENT
Start: 2020-01-10 | End: 2020-01-11

## 2020-01-10 RX ORDER — NAFCILLIN 10 G/100ML
2 INJECTION, POWDER, FOR SOLUTION INTRAVENOUS
Qty: 0 | Refills: 0 | DISCHARGE
Start: 2020-01-10

## 2020-01-10 RX ADMIN — NAFCILLIN 200 GRAM(S): 10 INJECTION, POWDER, FOR SOLUTION INTRAVENOUS at 07:10

## 2020-01-10 RX ADMIN — CHLORHEXIDINE GLUCONATE 1 APPLICATION(S): 213 SOLUTION TOPICAL at 07:10

## 2020-01-10 RX ADMIN — Medication 250 MILLIGRAM(S): at 05:47

## 2020-01-10 RX ADMIN — NAFCILLIN 200 GRAM(S): 10 INJECTION, POWDER, FOR SOLUTION INTRAVENOUS at 02:28

## 2020-01-10 RX ADMIN — NAFCILLIN 200 GRAM(S): 10 INJECTION, POWDER, FOR SOLUTION INTRAVENOUS at 15:41

## 2020-01-10 RX ADMIN — Medication 1 TABLET(S): at 11:28

## 2020-01-10 RX ADMIN — NAFCILLIN 200 GRAM(S): 10 INJECTION, POWDER, FOR SOLUTION INTRAVENOUS at 18:30

## 2020-01-10 RX ADMIN — NAFCILLIN 200 GRAM(S): 10 INJECTION, POWDER, FOR SOLUTION INTRAVENOUS at 11:20

## 2020-01-10 RX ADMIN — NAFCILLIN 200 GRAM(S): 10 INJECTION, POWDER, FOR SOLUTION INTRAVENOUS at 22:18

## 2020-01-10 NOTE — PROGRESS NOTE ADULT - SUBJECTIVE AND OBJECTIVE BOX
HPI:      Allergies    No Known Allergies    Intolerances        MEDICATIONS  (STANDING):  acetaminophen  IVPB .. 1000 milliGRAM(s) IV Intermittent once  chlorhexidine 4% Liquid 1 Application(s) Topical <User Schedule>  influenza   Vaccine 0.5 milliLiter(s) IntraMuscular once  multivitamin 1 Tablet(s) Oral daily  nafcillin  IVPB 2 Gram(s) IV Intermittent every 4 hours  vancomycin  IVPB 1000 milliGRAM(s) IV Intermittent every 12 hours    MEDICATIONS  (PRN):  acetaminophen   Tablet .. 650 milliGRAM(s) Oral every 6 hours PRN Temp greater or equal to 38C (100.4F)  benzocaine 15 mG/menthol 3.6 mG (Sugar-Free) Lozenge 1 Lozenge Oral every 2 hours PRN Sore Throat  bisacodyl 5 milliGRAM(s) Oral daily PRN Constipation  HYDROmorphone  Injectable 0.25 milliGRAM(s) IV Push every 10 minutes PRN Moderate Pain (4 - 6)  HYDROmorphone  Injectable 0.5 milliGRAM(s) IV Push every 10 minutes PRN Severe Pain (7 - 10)  metoclopramide Injectable 10 milliGRAM(s) IV Push every 6 hours PRN Nausea and/or Vomiting  ondansetron Injectable 4 milliGRAM(s) IV Push once PRN Nausea and/or Vomiting  oxyCODONE    IR 10 milliGRAM(s) Oral every 4 hours PRN Severe Pain (7 - 10)  oxyCODONE    IR 5 milliGRAM(s) Oral every 4 hours PRN Moderate Pain (4 - 6)  senna 2 Tablet(s) Oral at bedtime PRN Constipation  traMADol 50 milliGRAM(s) Oral every 6 hours PRN Mild Pain (1 - 3)      REVIEW OF SYSTEMS:    CONSTITUTIONAL: No fever, chills, weight loss, or fatigue  HEENT: No sore throat, runny nose, ear ache  RESPIRATORY: No cough, wheezing, No shortness of breath  CARDIOVASCULAR: No chest pain, palpitations, dizziness  GASTROINTESTINAL: No abdominal pain. No nausea, vomiting, diarrhea  GENITOURINARY: No dysuria, increase frequency, hematuria, or incontinence  NEUROLOGICAL: No headaches, memory loss, loss of strength, numbness, or tremors, no weakness  EXTREMITY: No pedal edema BLE  SKIN: No itching, burning, rashes, or lesions     VITAL SIGNS:  T(C): 36.1 (01-10-20 @ 17:02), Max: 36.4 (01-10-20 @ 11:28)  T(F): 97 (01-10-20 @ 17:02), Max: 97.5 (01-10-20 @ 11:28)  HR: 95 (01-10-20 @ 17:02) (71 - 95)  BP: 141/80 (01-10-20 @ 17:02) (119/66 - 141/80)  RR: 17 (01-10-20 @ 17:02) (16 - 18)  SpO2: 98% (01-10-20 @ 17:02) (96% - 98%)  Wt(kg): --    PHYSICAL EXAM:    GENERAL: not in any distress  HEENT: Neck is supple, normocephalic, atraumatic   CHEST/LUNG: Clear to auscultation bilaterally; No rales, rhonchi, wheezing  HEART: Regular rate and rhythm; No murmurs, rubs, or gallops  ABDOMEN: Soft, Nontender, Nondistended; Bowel sounds present, no rebound   EXTREMITIES:  2+ Peripheral Pulses, No clubbing, cyanosis, or edema  GENITOURINARY:   SKIN: No rashes or lesions  BACK: no pressor sore   NERVOUS SYSTEM:  Alert & Oriented X3, Good concentration  PSYCH: normal affect     LABS:     01-10    138  |  106  |  18  ----------------------------<  120<H>  3.9   |  28  |  0.90    Ca    8.7      10 Magdy 2020 07:08                      Sedimentation Rate, Erythrocyte: 7 mm/hr (01-06 @ 18:03)      Vancomycin Level, Trough: 8.3 ug/mL (01-08 @ 15:36)        Culture Results:   No growth to date. (01-07 @ 01:33)  Culture Results:   No growth to date. (01-07 @ 01:33)  Culture Results:   Rare Staphylococcus aureus (01-07 @ 01:15)  Culture Results:   No growth (01-07 @ 01:14)  Culture Results:   Growth in fluid media only Staphylococcus aureus  See previous culture [id number] (01-07 @ 01:13)                Radiology: HPI:  64 year old male admitted for removal of infected hardware      Allergies    No Known Allergies    Intolerances        MEDICATIONS  (STANDING):  acetaminophen  IVPB .. 1000 milliGRAM(s) IV Intermittent once  chlorhexidine 4% Liquid 1 Application(s) Topical <User Schedule>  influenza   Vaccine 0.5 milliLiter(s) IntraMuscular once  multivitamin 1 Tablet(s) Oral daily  nafcillin  IVPB 2 Gram(s) IV Intermittent every 4 hours  vancomycin  IVPB 1000 milliGRAM(s) IV Intermittent every 12 hours    MEDICATIONS  (PRN):  acetaminophen   Tablet .. 650 milliGRAM(s) Oral every 6 hours PRN Temp greater or equal to 38C (100.4F)  benzocaine 15 mG/menthol 3.6 mG (Sugar-Free) Lozenge 1 Lozenge Oral every 2 hours PRN Sore Throat  bisacodyl 5 milliGRAM(s) Oral daily PRN Constipation  HYDROmorphone  Injectable 0.25 milliGRAM(s) IV Push every 10 minutes PRN Moderate Pain (4 - 6)  HYDROmorphone  Injectable 0.5 milliGRAM(s) IV Push every 10 minutes PRN Severe Pain (7 - 10)  metoclopramide Injectable 10 milliGRAM(s) IV Push every 6 hours PRN Nausea and/or Vomiting  ondansetron Injectable 4 milliGRAM(s) IV Push once PRN Nausea and/or Vomiting  oxyCODONE    IR 10 milliGRAM(s) Oral every 4 hours PRN Severe Pain (7 - 10)  oxyCODONE    IR 5 milliGRAM(s) Oral every 4 hours PRN Moderate Pain (4 - 6)  senna 2 Tablet(s) Oral at bedtime PRN Constipation  traMADol 50 milliGRAM(s) Oral every 6 hours PRN Mild Pain (1 - 3)      REVIEW OF SYSTEMS:    feels fine     VITAL SIGNS:  T(C): 36.1 (01-10-20 @ 17:02), Max: 36.4 (01-10-20 @ 11:28)  T(F): 97 (01-10-20 @ 17:02), Max: 97.5 (01-10-20 @ 11:28)  HR: 95 (01-10-20 @ 17:02) (71 - 95)  BP: 141/80 (01-10-20 @ 17:02) (119/66 - 141/80)  RR: 17 (01-10-20 @ 17:02) (16 - 18)  SpO2: 98% (01-10-20 @ 17:02) (96% - 98%)  Wt(kg): --    PHYSICAL EXAM:    GENERAL: not in any distress  HEENT: Neck is supple, normocephalic, atraumatic   CHEST/LUNG: Clear to auscultation bilaterally; No rales, rhonchi, wheezing  HEART: Regular rate and rhythm; No murmurs, rubs, or gallops  ABDOMEN: Soft, Nontender, Nondistended; Bowel sounds present, no rebound   EXTREMITIES:  2+ Peripheral Pulses, No clubbing, cyanosis, or edema  dressing plus   SKIN: No rashes or lesions  BACK: no pressor sore   NERVOUS SYSTEM:  Alert & Oriented X3, Good concentration  PSYCH: normal affect     LABS:     01-10    138  |  106  |  18  ----------------------------<  120<H>  3.9   |  28  |  0.90    Ca    8.7      10 Magdy 2020 07:08                      Sedimentation Rate, Erythrocyte: 7 mm/hr (01-06 @ 18:03)      Vancomycin Level, Trough: 8.3 ug/mL (01-08 @ 15:36)        Culture Results:   No growth to date. (01-07 @ 01:33)  Culture Results:   No growth to date. (01-07 @ 01:33)  Culture Results:   Rare Staphylococcus aureus (01-07 @ 01:15)  Culture Results:   No growth (01-07 @ 01:14)  Culture Results:   Growth in fluid media only Staphylococcus aureus  See previous culture [id number] (01-07 @ 01:13)                Radiology:

## 2020-01-10 NOTE — PROGRESS NOTE ADULT - ASSESSMENT
s/p L Ankle JAMEEL hardware infection s/p I & D    OT note reviewed   s/p removal of hardware, irrigation and debridement, removal of necrotic tissue  fu cultures   no fever, no leukocytosis   will need prolonged iv abx   presentation likely concerning deep infection  culture growing staph aureus   sp PICC   case discussed with patient and ortho  discussed with pts wife   all possible complications of picc antibiotics discussed with patient   pt can be fu with [facility] [facility] upon discharge  ( [phone] ) [address] , [facility] FU

## 2020-01-10 NOTE — CHART NOTE - NSCHARTNOTEFT_GEN_A_CORE
Discussed with ID team: Ancef 2 g q8h can be substituted for earlier recommended Nafcillin for MSSA. Patient can still be discharged today pending placement

## 2020-01-10 NOTE — PROGRESS NOTE ADULT - SUBJECTIVE AND OBJECTIVE BOX
Patient seen and examined. Pain controlled.    Physical exam  VS:  Vital Signs Last 24 Hrs  T(C): 36.1 (10 Magdy 2020 00:44), Max: 36.8 (09 Jan 2020 17:33)  T(F): 97 (10 Magdy 2020 00:44), Max: 98.2 (09 Jan 2020 17:33)  HR: 77 (10 Magdy 2020 00:44) (77 - 82)  BP: 120/67 (10 Magdy 2020 00:44) (111/69 - 121/67)  BP(mean): --  RR: 18 (10 Magdy 2020 00:44) (17 - 18)  SpO2: 96% (10 Magdy 2020 00:44) (96% - 97%)  Gen: NAD  Left LE: Dressing intact. +EHL/FHL/TA/GSC. SILT L3-S1. Capillary refill brisk. Compartments soft and compressible.      A/P: 64yM POD 3 s/p L Ank JAMEEL/I&D    -PICC in place  -Pain control  -WBAT LLE  -DVT ppx  -Incentive Spirometry  -Elevation to extremity  -Medical management appreciated  -Follow cultures  -Ortho stable for discharge

## 2020-01-11 VITALS
TEMPERATURE: 98 F | OXYGEN SATURATION: 97 % | HEART RATE: 84 BPM | SYSTOLIC BLOOD PRESSURE: 118 MMHG | RESPIRATION RATE: 17 BRPM | WEIGHT: 186.29 LBS | DIASTOLIC BLOOD PRESSURE: 71 MMHG

## 2020-01-11 LAB
CULTURE RESULTS: SIGNIFICANT CHANGE UP
CULTURE RESULTS: SIGNIFICANT CHANGE UP
ORGANISM # SPEC MICROSCOPIC CNT: SIGNIFICANT CHANGE UP
ORGANISM # SPEC MICROSCOPIC CNT: SIGNIFICANT CHANGE UP
SPECIMEN SOURCE: SIGNIFICANT CHANGE UP
SPECIMEN SOURCE: SIGNIFICANT CHANGE UP

## 2020-01-11 RX ADMIN — CHLORHEXIDINE GLUCONATE 1 APPLICATION(S): 213 SOLUTION TOPICAL at 06:34

## 2020-01-11 RX ADMIN — NAFCILLIN 200 GRAM(S): 10 INJECTION, POWDER, FOR SOLUTION INTRAVENOUS at 02:28

## 2020-01-11 RX ADMIN — NAFCILLIN 200 GRAM(S): 10 INJECTION, POWDER, FOR SOLUTION INTRAVENOUS at 06:30

## 2020-01-11 NOTE — PROGRESS NOTE ADULT - SUBJECTIVE AND OBJECTIVE BOX
Orthopedics      Patient seen and examined at bedside. Feeling well. Pain controlled. No n/v. No acute events overnight.    Vital Signs Last 24 Hrs  T(C): 36.4 (01-11-20 @ 05:07), Max: 36.7 (01-10-20 @ 23:18)  T(F): 97.6 (01-11-20 @ 05:07), Max: 98 (01-10-20 @ 23:18)  HR: 84 (01-11-20 @ 05:07) (71 - 97)  BP: 118/71 (01-11-20 @ 05:07) (118/71 - 141/80)  BP(mean): --  RR: 17 (01-11-20 @ 05:07) (16 - 17)  SpO2: 97% (01-11-20 @ 05:07) (96% - 98%)    10 Magdy 2020 07:08    138    |  106    |  18     ----------------------------<  120    3.9     |  28     |  0.90     Ca    8.7        10 Magdy 2020 07:08          Exam:  Gen: NAD, resting comfortably  LLE:  Dressing c/d/i  NTTP calves b/l  +EHL/FHL/TA/GS  SILT L2-S1  Compartments soft and compressible  2+ Pulses palpable      A/P: 64yM POD 5 s/p L ankle JAMEEL/I&D  -FU AM labs  -Pain control  -WBAT LLE in CAM boot  -DVT ppx  -Incentive Spirometry  -Elevation to extremity  -Medical management appreciated  -DC today to home with IV abx/PICC, appreciate case management recommendations

## 2020-01-12 LAB
CULTURE RESULTS: SIGNIFICANT CHANGE UP
SPECIMEN SOURCE: SIGNIFICANT CHANGE UP

## 2020-01-21 ENCOUNTER — APPOINTMENT (OUTPATIENT)
Dept: ORTHOPEDIC SURGERY | Facility: CLINIC | Age: 65
End: 2020-01-21
Payer: COMMERCIAL

## 2020-01-21 DIAGNOSIS — T14.8XXA OTHER INJURY OF UNSPECIFIED BODY REGION, INITIAL ENCOUNTER: ICD-10-CM

## 2020-01-21 PROCEDURE — 99024 POSTOP FOLLOW-UP VISIT: CPT

## 2020-01-21 NOTE — HISTORY OF PRESENT ILLNESS
[de-identified] : 63 yo M s/p Irrigation and debridement of left ankle, partial sequestrectomy of distal fibula, removal of hardware, 1/6/20. doing well.  receiving iv abx.  has not followed up w ID>  pain contreolled [de-identified] : L ankle [de-identified] : Left ankle exam\par Incisions are healing well no erythema\par Mild swelling\par mild ttp about the ankle\par calf is soft and nontender\par Able to flex and extend all toes\par Sensation intact throughout\par brisk capillary refill.\par  [de-identified] : 65 yo M s/p Irrigation and debridement of left ankle, partial sequestrectomy of distal fibula, removal of hardware, 1/6/20. [de-identified] : Sutures removed Steri-Strips placed. We reviewed postoperative protocol instructions. Weightbearing as tolerated in the boot. Follow up 2 weeks. All questions answered

## 2020-02-04 ENCOUNTER — APPOINTMENT (OUTPATIENT)
Dept: ORTHOPEDIC SURGERY | Facility: CLINIC | Age: 65
End: 2020-02-04
Payer: COMMERCIAL

## 2020-02-04 PROCEDURE — 99024 POSTOP FOLLOW-UP VISIT: CPT

## 2020-02-04 PROCEDURE — 73610 X-RAY EXAM OF ANKLE: CPT | Mod: LT

## 2020-02-04 NOTE — HISTORY OF PRESENT ILLNESS
[de-identified] : L ankle [de-identified] : The following radiographs were ordered and read by me during this patients visit. I reviewed each radiograph in detail with the patient and discussed the findings as highlighted below. \par \par 3 views of the left ankle were obtained today.  postsurgical changes with sclerotic margins of the fibula healed fracture\par \par  [de-identified] : 63 yo M s/p Irrigation and debridement of left ankle, partial sequestrectomy of distal fibula, removal of hardware, 1/6/20. He stopped wearing his boot.  He reports no pain.  He was been back to all activities.  Continuing abx, ID appt sonia [de-identified] : Left ankle exam\par Incisions are healing well no erythema\par Mild effusion\par mild ttp about the ankle\par calf is soft and nontender\par Able to flex and extend all toes\par Sensation intact throughout\par brisk capillary refill.\par  [de-identified] : 63 yo M s/p Irrigation and debridement of left ankle, partial sequestrectomy of distal fibula, removal of hardware, 1/6/20. [de-identified] : 64-year-old male one month status post removal of hardware left ankle being treated for osteomyelitis. Continue antibiotics per infectious disease. It was recommended that he be any cam boot which he has ignored. Weightbearing as tolerated in regular sneakers at this time no running jumping or other sporting-type activity. He expressed understanding of this he will followup in one month

## 2020-03-03 ENCOUNTER — APPOINTMENT (OUTPATIENT)
Dept: ORTHOPEDIC SURGERY | Facility: CLINIC | Age: 65
End: 2020-03-03
Payer: COMMERCIAL

## 2020-03-03 DIAGNOSIS — S82.842D DISPLACED BIMALLEOLAR FRACTURE OF LEFT LOWER LEG, SUBSEQUENT ENCOUNTER FOR CLOSED FRACTURE WITH ROUTINE HEALING: ICD-10-CM

## 2020-03-03 PROCEDURE — 99024 POSTOP FOLLOW-UP VISIT: CPT

## 2020-03-03 PROCEDURE — 73610 X-RAY EXAM OF ANKLE: CPT | Mod: LT

## 2020-03-03 NOTE — HISTORY OF PRESENT ILLNESS
[de-identified] : L ankle [de-identified] : 63 yo M s/p Irrigation and debridement of left ankle, partial sequestrectomy of distal fibula, removal of hardware, 1/6/20.  he is doing well overall.  iv abx stopped 2 weeks ago, now on PO abx [de-identified] : Left ankle exam\par Incisions are healing well no erythema\par no ttp about the ankle\par df neutral pf 30\par calf is soft and nontender\par Able to flex and extend all toes\par Sensation intact throughout\par brisk capillary refill.\par  [de-identified] : \par The following radiographs were ordered and read by me during this patients visit. I reviewed each radiograph in detail with the patient and discussed the findings as highlighted below. \par \par 3 views of the left ankle were obtained today there is a healed distal fibula fracture with abundant callus. Post surgical changes. 2 screws medially\par  [de-identified] : 65 yo M s/p Irrigation and debridement of left ankle, partial sequestrectomy of distal fibula, removal of hardware, 1/6/20. [de-identified] : Status post removal of hardware of infected ankle. No signs of residual infection. Antibiotics per infectious disease he is now off IV antibiotics and oral antibiotics. Fracture is healed. Activities as tolerated. Followup 6 weeks

## 2020-06-01 NOTE — PATIENT PROFILE ADULT - VISION (WITH CORRECTIVE LENSES IF THE PATIENT USUALLY WEARS THEM):
[Patient] : patient
Normal vision: sees adequately in most situations; can see medication labels, newsprint

## 2020-06-02 ENCOUNTER — APPOINTMENT (OUTPATIENT)
Dept: ORTHOPEDIC SURGERY | Facility: CLINIC | Age: 65
End: 2020-06-02

## 2021-09-15 NOTE — DISCHARGE NOTE PROVIDER - NSDCHHATTENDCERT_GEN_ALL_CORE
My signature below certifies that the above stated patient is homebound and upon completion of the Face-To-Face encounter, has the need for intermittent skilled nursing, physical therapy and/or speech or occupational therapy services in their home for their current diagnosis as outlined in their initial plan of care. These services will continue to be monitored by myself or another physician.
Lida Huggins)  Obstetrics and Gynecology  [facility] [place] [address]  Phone: [phone]  Fax: [phone]  Follow Up Time:

## 2023-07-13 NOTE — H&P PST ADULT - VENOUS THROMBOEMBOLISM CURRENT STATUS
PAST MEDICAL HISTORY:  Atrial fibrillation     BPH (benign prostatic hyperplasia)     Chronic kidney disease, unspecified CKD stage     Hyperlipidemia     Hypertension     
major surgery, including arthroscopic and laparoscopic (greater than 1 hr)

## 2023-07-17 ENCOUNTER — EMERGENCY (EMERGENCY)
Facility: HOSPITAL | Age: 68
LOS: 0 days | Discharge: HOME | End: 2023-07-17
Attending: STUDENT IN AN ORGANIZED HEALTH CARE EDUCATION/TRAINING PROGRAM
Payer: MEDICARE

## 2023-07-17 VITALS
HEART RATE: 96 BPM | OXYGEN SATURATION: 97 % | TEMPERATURE: 98 F | SYSTOLIC BLOOD PRESSURE: 167 MMHG | DIASTOLIC BLOOD PRESSURE: 94 MMHG | WEIGHT: 195.11 LBS | HEIGHT: 70 IN | RESPIRATION RATE: 18 BRPM

## 2023-07-17 VITALS
RESPIRATION RATE: 17 BRPM | DIASTOLIC BLOOD PRESSURE: 85 MMHG | HEART RATE: 83 BPM | SYSTOLIC BLOOD PRESSURE: 144 MMHG | OXYGEN SATURATION: 97 % | TEMPERATURE: 98 F

## 2023-07-17 DIAGNOSIS — S81.811A LACERATION WITHOUT FOREIGN BODY, RIGHT LOWER LEG, INITIAL ENCOUNTER: ICD-10-CM
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
## 2023-07-17 DIAGNOSIS — Z98.890 OTHER SPECIFIED POSTPROCEDURAL STATES: Chronic | ICD-10-CM
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
## 2023-07-17 DIAGNOSIS — Z23 ENCOUNTER FOR IMMUNIZATION: ICD-10-CM

## 2023-07-17 PROCEDURE — 99284 EMERGENCY DEPT VISIT MOD MDM: CPT

## 2023-07-17 RX ORDER — TETANUS TOXOID, REDUCED DIPHTHERIA TOXOID AND ACELLULAR PERTUSSIS VACCINE, ADSORBED 5; 2.5; 8; 8; 2.5 [IU]/.5ML; [IU]/.5ML; UG/.5ML; UG/.5ML; UG/.5ML
0.5 SUSPENSION INTRAMUSCULAR ONCE
Refills: 0 | Status: COMPLETED | OUTPATIENT
Start: 2023-07-17 | End: 2023-07-17

## 2023-07-17 RX ORDER — CEPHALEXIN 500 MG
1 CAPSULE ORAL
Qty: 10 | Refills: 0
Start: 2023-07-17 | End: 2023-07-21

## 2023-07-17 RX ORDER — CEPHALEXIN 500 MG
500 CAPSULE ORAL ONCE
Refills: 0 | Status: COMPLETED | OUTPATIENT
Start: 2023-07-17 | End: 2023-07-17

## 2023-07-17 RX ADMIN — TETANUS TOXOID, REDUCED DIPHTHERIA TOXOID AND ACELLULAR PERTUSSIS VACCINE, ADSORBED 0.5 MILLILITER(S): 5; 2.5; 8; 8; 2.5 SUSPENSION INTRAMUSCULAR at 21:18

## 2023-07-17 RX ADMIN — Medication 500 MILLIGRAM(S): at 21:18

## 2023-07-17 NOTE — ED PROVIDER NOTE - PATIENT PORTAL LINK FT
You can access the FollowMyHealth Patient Portal offered by [facility] by registering at the following website: [email or web address]. By joining [employer]’s FollowMyHealth portal, you will also be able to view your health information using other applications (apps) compatible with our system.

## 2023-07-17 NOTE — ED PROVIDER NOTE - PHYSICAL EXAMINATION
General: well appearing in NAD, AxOx3, speaking full sentences  HEENT: NC/AT, MMM, PERRL  Resp: no respiratory distress, tachypnea, or accessory muscle usage  Ext: 7 inch laceration lateral R shin down to muscle. Pt ambulatory and has full ROM of toes, ankle, knee, hip. dp/pt pulses 2+. Sensation intact. Wound down to muscle. No tendon exposure. Pt able to dorsi/plantar john foot without issue.
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
## 2023-07-17 NOTE — ED ADULT NURSE NOTE - NSFALLUNIVINTERV_ED_ALL_ED
Bed/Stretcher in lowest position, wheels locked, appropriate side rails in place/Call bell, personal items and telephone in reach/Instruct patient to call for assistance before getting out of bed/chair/stretcher/Non-slip footwear applied when patient is off stretcher/[place] to call system/Physically safe environment - no spills, clutter or unnecessary equipment/Purposeful proactive rounding/Room/bathroom lighting operational, light cord in reach
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
## 2023-08-06 ENCOUNTER — EMERGENCY (EMERGENCY)
Facility: HOSPITAL | Age: 68
LOS: 0 days | Discharge: HOME | End: 2023-08-06
Attending: STUDENT IN AN ORGANIZED HEALTH CARE EDUCATION/TRAINING PROGRAM
Payer: MEDICARE

## 2023-08-06 VITALS
WEIGHT: 184.97 LBS | TEMPERATURE: 98 F | RESPIRATION RATE: 18 BRPM | DIASTOLIC BLOOD PRESSURE: 75 MMHG | HEIGHT: 70 IN | OXYGEN SATURATION: 98 % | HEART RATE: 85 BPM | SYSTOLIC BLOOD PRESSURE: 123 MMHG

## 2023-08-06 VITALS
DIASTOLIC BLOOD PRESSURE: 79 MMHG | OXYGEN SATURATION: 98 % | SYSTOLIC BLOOD PRESSURE: 119 MMHG | TEMPERATURE: 98 F | RESPIRATION RATE: 19 BRPM | HEART RATE: 80 BPM
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
## 2023-08-06 DIAGNOSIS — S81.812D LACERATION WITHOUT FOREIGN BODY, LEFT LOWER LEG, SUBSEQUENT ENCOUNTER: ICD-10-CM

## 2023-08-06 DIAGNOSIS — Z98.890 OTHER SPECIFIED POSTPROCEDURAL STATES: Chronic | ICD-10-CM

## 2023-08-06 PROCEDURE — L9995: CPT
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
## 2023-08-06 NOTE — ED PROVIDER NOTE - NSFOLLOWUPINSTRUCTIONS_ED_ALL_ED_FT
You were seen today for suture removal - you had 15 sutures removed without incident    Continue your antibiotics    Return for any severe pain, swelling, fevers, or shortness of breath
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
## 2023-08-06 NOTE — ED ADULT TRIAGE NOTE - CHIEF COMPLAINT QUOTE
pt is her to here to have suture remove from his left leg. sutures placed 3 week ago. pt also c/o redness of left lower leg. states he is on antibiotic.

## 2023-08-06 NOTE — ED PROVIDER NOTE - PATIENT PORTAL LINK FT
You can access the FollowMyHealth Patient Portal offered by [facility] by registering at the following website: [email or web address]. By joining [employer]’s FollowMyHealth portal, you will also be able to view your health information using other applications (apps) compatible with our system.

## 2023-08-06 NOTE — ED PROVIDER NOTE - PHYSICAL EXAMINATION
Gen: AOX3, NAD  Head: NCAT  ENT: Airway patent, moist mucous membranes, nasal passageways clear  Cardiac: Normal rate  Respiratory: normal respirations and normal work of breathing   Gastrointestinal: Abdomen nondistended, central adiposity absent   MSK: No gross abnormalities, FROM of all four extremities, + L anterior shin mild nonpitting edema, no calf swelling or calf ttp   Skin: + healed midline anterior leg wound - 15 sutures remain in place with minimal surrounding erythema- no crepitus or purulence   Neuro: No gross neurologic deficits, normal speech, no gait abnormality

## 2023-08-06 NOTE — ED ADULT NURSE NOTE - OBJECTIVE STATEMENT
Pt AOx4, responsive, ambulatory. Pt here for suture removal from LLE placed 3 weeks ago here. Pt went for suture removal at [facility] MD on Aug 2 but md was unable to remove all suture 2/2 leg swelling; pt was placed on doxycycline 100mg. Pt incision dry and intact, redness present. Pt denies fever/chills, n/v/d, pain, bleeding at LLE site.
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
## 2024-10-14 ENCOUNTER — EMERGENCY (EMERGENCY)
Facility: HOSPITAL | Age: 69
LOS: 0 days | Discharge: HOME | End: 2024-10-14
Attending: STUDENT IN AN ORGANIZED HEALTH CARE EDUCATION/TRAINING PROGRAM
Payer: MEDICARE

## 2024-10-14 VITALS
WEIGHT: 184.97 LBS | TEMPERATURE: 98 F | HEART RATE: 87 BPM | RESPIRATION RATE: 16 BRPM | DIASTOLIC BLOOD PRESSURE: 98 MMHG | OXYGEN SATURATION: 100 % | SYSTOLIC BLOOD PRESSURE: 158 MMHG | HEIGHT: 71 IN

## 2024-10-14 DIAGNOSIS — K42.9 UMBILICAL HERNIA WITHOUT OBSTRUCTION OR GANGRENE: ICD-10-CM

## 2024-10-14 DIAGNOSIS — Z98.890 OTHER SPECIFIED POSTPROCEDURAL STATES: Chronic | ICD-10-CM

## 2024-10-14 DIAGNOSIS — D72.829 ELEVATED WHITE BLOOD CELL COUNT, UNSPECIFIED: ICD-10-CM

## 2024-10-14 LAB
ALBUMIN SERPL ELPH-MCNC: 3.7 G/DL — SIGNIFICANT CHANGE UP (ref 3.3–5)
ALP SERPL-CCNC: 97 U/L — SIGNIFICANT CHANGE UP (ref 40–120)
ALT FLD-CCNC: 28 U/L — SIGNIFICANT CHANGE UP (ref 12–78)
ANION GAP SERPL CALC-SCNC: 4 MMOL/L — LOW (ref 5–17)
APTT BLD: 30.1 SEC — SIGNIFICANT CHANGE UP (ref 24.5–35.6)
AST SERPL-CCNC: 21 U/L — SIGNIFICANT CHANGE UP (ref 15–37)
BASOPHILS # BLD AUTO: 0.04 K/UL — SIGNIFICANT CHANGE UP (ref 0–0.2)
BASOPHILS NFR BLD AUTO: 0.4 % — SIGNIFICANT CHANGE UP (ref 0–2)
BILIRUB SERPL-MCNC: 1.1 MG/DL — SIGNIFICANT CHANGE UP (ref 0.2–1.2)
BUN SERPL-MCNC: 17 MG/DL — SIGNIFICANT CHANGE UP (ref 7–23)
CALCIUM SERPL-MCNC: 9.5 MG/DL — SIGNIFICANT CHANGE UP (ref 8.5–10.1)
CHLORIDE SERPL-SCNC: 105 MMOL/L — SIGNIFICANT CHANGE UP (ref 96–108)
CO2 SERPL-SCNC: 29 MMOL/L — SIGNIFICANT CHANGE UP (ref 22–31)
CREAT SERPL-MCNC: 1.06 MG/DL — SIGNIFICANT CHANGE UP (ref 0.5–1.3)
EGFR: 77 ML/MIN/1.73M2 — SIGNIFICANT CHANGE UP
EOSINOPHIL # BLD AUTO: 0.02 K/UL — SIGNIFICANT CHANGE UP (ref 0–0.5)
EOSINOPHIL NFR BLD AUTO: 0.2 % — SIGNIFICANT CHANGE UP (ref 0–6)
GLUCOSE SERPL-MCNC: 115 MG/DL — HIGH (ref 70–99)
HCT VFR BLD CALC: 49.5 % — SIGNIFICANT CHANGE UP (ref 39–50)
HGB BLD-MCNC: 16.2 G/DL — SIGNIFICANT CHANGE UP (ref 13–17)
IMM GRANULOCYTES NFR BLD AUTO: 0.4 % — SIGNIFICANT CHANGE UP (ref 0–0.9)
INR BLD: 0.99 RATIO — SIGNIFICANT CHANGE UP (ref 0.85–1.16)
LACTATE SERPL-SCNC: 1.4 MMOL/L — SIGNIFICANT CHANGE UP (ref 0.7–2)
LYMPHOCYTES # BLD AUTO: 1.33 K/UL — SIGNIFICANT CHANGE UP (ref 1–3.3)
LYMPHOCYTES # BLD AUTO: 11.8 % — LOW (ref 13–44)
MCHC RBC-ENTMCNC: 30.3 PG — SIGNIFICANT CHANGE UP (ref 27–34)
MCHC RBC-ENTMCNC: 32.7 G/DL — SIGNIFICANT CHANGE UP (ref 32–36)
MCV RBC AUTO: 92.5 FL — SIGNIFICANT CHANGE UP (ref 80–100)
MONOCYTES # BLD AUTO: 1.29 K/UL — HIGH (ref 0–0.9)
MONOCYTES NFR BLD AUTO: 11.5 % — SIGNIFICANT CHANGE UP (ref 2–14)
NEUTROPHILS # BLD AUTO: 8.5 K/UL — HIGH (ref 1.8–7.4)
NEUTROPHILS NFR BLD AUTO: 75.7 % — SIGNIFICANT CHANGE UP (ref 43–77)
NRBC # BLD: 0 /100 WBCS — SIGNIFICANT CHANGE UP (ref 0–0)
PLATELET # BLD AUTO: 284 K/UL — SIGNIFICANT CHANGE UP (ref 150–400)
POTASSIUM SERPL-MCNC: 4.5 MMOL/L — SIGNIFICANT CHANGE UP (ref 3.5–5.3)
POTASSIUM SERPL-SCNC: 4.5 MMOL/L — SIGNIFICANT CHANGE UP (ref 3.5–5.3)
PROT SERPL-MCNC: 8.2 GM/DL — SIGNIFICANT CHANGE UP (ref 6–8.3)
PROTHROM AB SERPL-ACNC: 11.2 SEC — SIGNIFICANT CHANGE UP (ref 9.9–13.4)
RBC # BLD: 5.35 M/UL — SIGNIFICANT CHANGE UP (ref 4.2–5.8)
RBC # FLD: 12.8 % — SIGNIFICANT CHANGE UP (ref 10.3–14.5)
SODIUM SERPL-SCNC: 138 MMOL/L — SIGNIFICANT CHANGE UP (ref 135–145)
WBC # BLD: 11.23 K/UL — HIGH (ref 3.8–10.5)
WBC # FLD AUTO: 11.23 K/UL — HIGH (ref 3.8–10.5)

## 2024-10-14 PROCEDURE — 74176 CT ABD & PELVIS W/O CONTRAST: CPT | Mod: 26,59,MC

## 2024-10-14 PROCEDURE — 99285 EMERGENCY DEPT VISIT HI MDM: CPT

## 2024-10-14 PROCEDURE — 74177 CT ABD & PELVIS W/CONTRAST: CPT | Mod: 26,MC

## 2024-10-14 PROCEDURE — 71045 X-RAY EXAM CHEST 1 VIEW: CPT | Mod: 26

## 2024-10-14 RX ORDER — HYDROMORPHONE HYDROCHLORIDE 1 MG/ML
1 INJECTION, SOLUTION INTRAMUSCULAR; INTRAVENOUS; SUBCUTANEOUS ONCE
Refills: 0 | Status: DISCONTINUED | OUTPATIENT
Start: 2024-10-14 | End: 2024-10-14

## 2024-10-14 RX ORDER — SODIUM CHLORIDE 0.9 % (FLUSH) 0.9 %
1000 SYRINGE (ML) INJECTION ONCE
Refills: 0 | Status: COMPLETED | OUTPATIENT
Start: 2024-10-14 | End: 2024-10-14

## 2024-10-14 RX ADMIN — HYDROMORPHONE HYDROCHLORIDE 1 MILLIGRAM(S): 1 INJECTION, SOLUTION INTRAMUSCULAR; INTRAVENOUS; SUBCUTANEOUS at 18:43

## 2024-10-14 RX ADMIN — Medication 1000 MILLILITER(S): at 12:41

## 2024-10-14 NOTE — ED ADULT NURSE NOTE - NSFALLASSESSNEED_ED_ALL_ED
Statement Selected
CHF exacerbation, likely 2-3 infection/pneumonia versus self DC of diuretic medications.  Improved after diuresis in the ED but still requiring oxygen, admitted to [facility] for continued treatment of pneumonia and CHF exacerbation.  Awaiting callback from [facility] hospitalist.
no

## 2024-10-14 NOTE — ED PROVIDER NOTE - NS ED ROS FT
CONSTITUTIONAL: No weight loss, fever, chills, weakness or fatigue.    HEENT:    Eyes: No visual loss, blurred vision, double vision or yellow sclerae.  Ears, Nose, Throat: No hearing loss, sneezing, congestion, runny nose or sore throat.    CARDIOVASCULAR: No chest pain, chest pressure or chest discomfort. No palpitations or edema.    RESPIRATORY: No shortness of breath, cough or sputum.    GASTROINTESTINAL: No anorexia, nausea, vomiting or diarrhea.  Abdominal pain with mass noted on the umbilical area.    SKIN: No rash or itching.    GENITOURINARY: Patient denies any changes to bowel or bladder function.    NEUROLOGICAL: No headache, dizziness, syncope, paralysis, ataxia, numbness or tingling in the extremities. No change in bowel or bladder control.    MUSCULOSKELETAL: No muscle, back pain, joint pain or stiffness.    PSYCHIATRIC: No suicidal or homicidal ideation.

## 2024-10-14 NOTE — ED PROVIDER NOTE - NSFOLLOWUPINSTRUCTIONS_ED_ALL_ED_FT
Umbilical Hernia Repair    WHAT YOU NEED TO KNOW:    What do I need to know about an umbilical hernia repair? An umbilical hernia repair is surgery to fix your umbilical (belly button) hernia. An umbilical hernia may be repaired if the hernia is preventing blood flow to your organs, blocking your intestines, or causing pain. An open repair or laparoscopic repair may be done to fix your umbilical hernia.    How do I prepare for an umbilical hernia repair? Your healthcare provider will talk to you about how to prepare for surgery. He or she may tell you not to eat or drink anything after midnight on the day of surgery. He or she will tell you what medicines you should take or not take on the day of surgery. You may be given an antibiotic through your IV to help prevent a bacterial infection. Tell your healthcare provider if you have ever had an allergic reaction to an antibiotic. Arrange for someone to drive you home and stay with you after surgery. The person can watch for problems and help you around the house.    What will happen during a laparoscopic umbilical hernia repair?    You will be given general anesthesia to keep you asleep and free from pain during surgery. Your healthcare provider will make a small incision above your belly button. He or she will insert a laparoscope through this incision. A laparoscope is a long metal tube with a light and camera on the end. The provider will insert other instruments by making 2 to 4 smaller incisions at different places on your abdomen. He or she will inflate your abdomen with gas. This will help your provider see your hernia better.    Your healthcare provider will move your intestines or tissue back into the correct place. Strong stitches will be used to close the opening in your abdominal wall. Mesh may be used to cover or plug the area. Mesh helps prevent the hernia from happening again. Your healthcare provider may close your incisions with stitches, medical glue, or Steri-strips™. He or she may also place a small bandage over the incisions.  What will happen during an open umbilical hernia repair?    You will be given general anesthesia to keep you asleep and free from pain during surgery. Your healthcare provider will make an incision near your belly button. He or she will move your intestines or tissue back into their correct place.    Strong stitches may be used to close the opening in your abdominal wall. Mesh may be used to cover or plug the area. Mesh helps prevent the hernia from happening again. Your healthcare provider may close your incision with stitches, medical glue, or Steri-strips™. He or she may also place a small bandage over the incision.  What will happen after an umbilical hernia repair? Healthcare providers will monitor you until you are awake. You may have pain, bloating, or nausea after your surgery. If you had a laparoscopic repair, you may have pain in your shoulder or near your ribs. This is from the gas used during surgery. Healthcare providers will give you medicine to help decrease pain and nausea. You may be able to go home when your pain is controlled, you can drink liquids, and you urinate.    What are the risks of an umbilical hernia repair? You may bleed more than expected or get an infection. A pocket of fluid may form under your skin. You may need treatment to remove it. Your umbilical hernia may return, or you may develop a hernia in a different location. Your intestines may be injured during the surgery. You may get a blood clot in your leg, arm, or lungs. This may become life-threatening.    CARE AGREEMENT:    You have the right to help plan your care. Learn about your health condition and how it may be treated. Discuss treatment options with your healthcare providers to decide what care you want to receive. You always have the right to refuse treatment.

## 2024-10-14 NOTE — ED ADULT NURSE NOTE - NSFALLRISKINTERV_ED_ALL_ED

## 2024-10-14 NOTE — ED PROVIDER NOTE - PHYSICAL EXAMINATION
GENERAL: The patient appears well and is in no apparent distress.    EYES: Pupils equal and reactive.  Extraocular eye movements are intact.    ENT: Head is atraumatic.  Posterior oropharynx is unremarkable.      RESPIRATORY: Lungs are clear to auscultation bilaterally.  The patient has no significant wheezing, rhonchi, or rales.    CARDIOVASCULAR: The patient has a regular rate and rhythm with no significant murmurs, rubs, or gallops.    ABDOMEN: Abdomen is soft, nondistended, and non-peritoneal.  Tender over the umbilical area.  There is a large mass noted.  Surrounding erythema noted.    SKIN: Skin is intact without evidence of significant lacerations or sores.    MUSCULOSKELETAL: Patient has good range of motion of all extremities.  The patient has good distal cap refill.  The patient has palpable distal pulses.  No obvious edema is noted.    NEUROLOGIC: Cranial nerves II through XII are grossly within normal limits.  Sensory and motor examination is unremarkable.    PSYCHIATRIC: Patient is awake, alert, and oriented ×4.

## 2024-10-14 NOTE — ED ADULT TRIAGE NOTE - CHIEF COMPLAINT QUOTE
Periumbilical pain and redness since yesterday. Patient states that he originally had generalized abd pain after taking amoxicillin x 1 week for teeth pain, also drank  apple juice yesterday

## 2024-10-14 NOTE — CONSULT NOTE ADULT - SUBJECTIVE AND OBJECTIVE BOX
Chief Complaint:  Patient is a 69y old  Male who presents with a chief complaint of abdominal pain    HPI: 70 y/o male presented to ER with c/o umbilical abdominal pain which started lat night. He stated he has always had a bulge by his umbilical area, since yesterday it became more swollen and tender, prompting him to come to ER. Denies nausea and vomiting fever, chills, chest pain or sob.      PMH/PSH:PAST MEDICAL & SURGICAL HISTORY:  No pertinent past medical history      History of ankle surgery  left ankle 4/2018    Allergies:  No Known Allergies      Medications:  HYDROmorphone  Injectable 1 milliGRAM(s) IV Push Once      Review of Systems:  Negative except for HPI    Relevant Family History:   FAMILY HISTORY:      Relevant Social History: Alcohol ( -) , Tobacco ( -) , Illicit drugs (- )     Physical Exam:    Vital Signs:  Vital Signs Last 24 Hrs  T(C): 36.6 (14 Oct 2024 10:45), Max: 36.6 (14 Oct 2024 10:45)  T(F): 97.8 (14 Oct 2024 10:45), Max: 97.8 (14 Oct 2024 10:45)  HR: 87 (14 Oct 2024 10:45) (87 - 87)  BP: 158/98 (14 Oct 2024 10:45) (158/98 - 158/98)  BP(mean): --  RR: 16 (14 Oct 2024 10:45) (16 - 16)  SpO2: 100% (14 Oct 2024 10:45) (100% - 100%)    Parameters below as of 14 Oct 2024 10:45  Patient On (Oxygen Delivery Method): room air      Daily Height in cm: 180.34 (14 Oct 2024 10:45)    Daily     General:  Appears stated age, well-groomed, well-nourished, no distress  HEENT:  NC/AT,  conjunctivae clear and pink, no thyromegaly, nodules, adenopathy, no JVD, anicteric sclera  Chest:  Full & symmetric excursion, no increased effort, breath sounds clear  Cardiovascular:  Regular rhythm, S1, S2, no murmur/rub/S3/S4, no abdominal bruit, no edema  Abdomen:  Soft, mildly distended, + umbilical hernia with some surrounding erythema  Extremities:  no cyanosis, clubbing or edema  Skin:  No rash/erythema/ecchymoses/petechiae/wounds/abscess/warm/dry  Neuro/Psych:  Alert, oriented, no asterixis, no tremor, no encephalopathy    Laboratory:                          16.2   11.23 )-----------( 284      ( 14 Oct 2024 13:20 )             49.5     10-14    138  |  105  |  17  ----------------------------<  115[H]  4.5   |  29  |  1.06    Ca    9.5      14 Oct 2024 13:20    TPro  8.2  /  Alb  3.7  /  TBili  1.1  /  DBili  x   /  AST  21  /  ALT  28  /  AlkPhos  97  10-14    LIVER FUNCTIONS - ( 14 Oct 2024 13:20 )  Alb: 3.7 g/dL / Pro: 8.2 gm/dL / ALK PHOS: 97 U/L / ALT: 28 U/L / AST: 21 U/L / GGT: x           PT/INR - ( 14 Oct 2024 13:20 )   PT: 11.2 sec;   INR: 0.99 ratio         PTT - ( 14 Oct 2024 13:20 )  PTT:30.1 sec  Urinalysis Basic - ( 14 Oct 2024 13:20 )    Color: x / Appearance: x / SG: x / pH: x  Gluc: 115 mg/dL / Ketone: x  / Bili: x / Urobili: x   Blood: x / Protein: x / Nitrite: x   Leuk Esterase: x / RBC: x / WBC x   Sq Epi: x / Non Sq Epi: x / Bacteria: x          Intake and Output      Imaging:  < from: CT Abdomen and Pelvis w/ IV Cont (10.14.24 @ 14:54) >  ACC: [id number] EXAM:  CT ABDOMEN AND PELVIS IC   ORDERED BY: SERENA CEDENO     PROCEDURE DATE:  10/14/2024          INTERPRETATION:  CLINICAL INFORMATION: Umbilical mass.    COMPARISON: None.    CONTRAST/COMPLICATIONS:  IV Contrast: Omnipaque 350  90 cc administered   10 cc discarded  Oral Contrast: NONE  Complications: None reported at time of study completion    PROCEDURE:  CT of the Abdomen and Pelvis was performed.  Sagittal and coronal reformats were performed.    FINDINGS:  LOWER CHEST: Leftbasilar reticular opacities. Mosaic attenuation which   could be related to air trapping. Small hiatal hernia.    LIVER: Steatosis.  BILE DUCTS: Normal caliber.  GALLBLADDER: Within normal limits.  SPLEEN: Within normal limits.  PANCREAS: Within normal limits.  ADRENALS: Within normal limits.  KIDNEYS/URETERS: 1.5 cm cyst upper left kidney. Tiny cortical cyst lower   pole of the right kidney. No hydronephrosis. Prominent left renal pelvis.   No ureteral dilatation on either side.    BLADDER: Within normal limits.  REPRODUCTIVE ORGANS: Prostate is enlarged.    BOWEL: Extension of small bowel into the umbilical hernia with resultant   bowel obstruction. Approximate maximum diameter of the small bowel is 3.6   cm. Small volume interloop fluid and trace peritoneal fluid in the left   and dependent pelvis. Haziness and stranding within the umbilical hernia   concerning for incarceration. Correlate clinically. Appendix is normal.   Uncomplicated diverticula, predominantly involving the sigmoid colon.  PERITONEUM/RETROPERITONEUM: As above.  VESSELS: Atherosclerotic changes.  LYMPH NODES: No lymphadenopathy.  ABDOMINAL WALL: Small bowel containing umbilical hernia.  BONES: Degenerative changes. Benign possibly posttraumatic deformity   right iliac crest.    IMPRESSION:  Small bowel containing umbilical hernia with resultant small bowel   obstruction and possible incarceration.  Small volume interloop fluid and trace ascites. No evidence of   pneumatosis, portal venous gas or pneumoperitoneum.  Additional findings as above.      < from: CT Abdomen and Pelvis No Cont (10.14.24 @ 17:32) >    ACC: [id number] EXAM:  CT ABDOMEN AND PELVIS   ORDERED BY: JACINDA PATEL     PROCEDURE DATE:  10/14/2024          INTERPRETATION:  CLINICAL INFORMATION: Umbilical hernia with small bowel   obstruction status post manual reduction.    COMPARISON: Same dayCT abdomen/pelvis    CONTRAST/COMPLICATIONS:  IV Contrast: NONE  Oral Contrast: NONE  Complications: None reported at time of study completion    PROCEDURE:  CT of the Abdomen and Pelvis was performed.  Sagittal and coronal reformats were performed.    FINDINGS:  LOWER CHEST: Within normal limits.    LIVER: Steatosis.  BILE DUCTS: Normal caliber.  GALLBLADDER: Within normal limits.  SPLEEN: Within normal limits.  PANCREAS: Within normal limits.  ADRENALS: Within normal limits.  KIDNEYS/URETERS: No hydronephrosis. Excretion of contrast into the renal   systems and ureter. Left renal cyst. Bilateral renal sinus cysts.    BLADDER: Within normal limits.  REPRODUCTIVE ORGANS: Prostate is enlarged.    BOWEL: Previous small bowel within the umbilical hernia has been reduced   into the peritoneal cavity. Previous dilated small bowel has resolved,   however, there is persistent mild small bowel wall thickening and   interloop fluid. Sigmoid diverticulosis without evidence of acute   diverticulitis. Small hiatal hernia.    PERITONEUM/RETROPERITONEUM: Small amount of pelvic free fluid,   nonspecific.  VESSELS: Atherosclerotic changes.  LYMPH NODES: No lymphadenopathy.  ABDOMINAL WALL: Moderate fat-containing umbilical hernia. Small amount of   fluid and stranding within the hernia sac.  BONES: Degenerative changes. Stable deformity of the right iliac wing,   possibly posttraumatic.    IMPRESSION:  Previous small bowel within the umbilical hernia has been reduced into   the peritoneal cavity and small bowel obstruction has resolved. There is   persistent mild small bowel wall thickening and interloop fluid, which   may be reactive, however close clinical follow up is recommended.    Moderate fat-containing umbilical hernia, now containinga small amount   of fluid and stranding in the hernia sac.      < end of copied text >

## 2024-10-14 NOTE — ED ADULT NURSE NOTE - CHPI ED NUR SYMPTOMS POS
Thank you! Thank you for allowing me to care for you in the emergency department. It is my goal to provide you with excellent care. If you have not received excellent quality care, please ask to speak to the nurse manager. Please fill out the survey that will come to you by mail or email since we listen to your feedback! Below you will find a list of your tests from today's visit. Should you have any questions, please do not hesitate to call the emergency department.     Labs  Recent Results (from the past 12 hour(s))   Basic Metabolic Panel    Collection Time: 08/02/23  8:12 AM   Result Value Ref Range    Sodium 135 (L) 136 - 145 mmol/L    Potassium 4.9 3.5 - 5.1 mmol/L    Chloride 98 97 - 108 mmol/L    CO2 29 21 - 32 mmol/L    Anion Gap 8 5 - 15 mmol/L    Glucose 126 (H) 65 - 100 mg/dL    BUN 18 6 - 20 mg/dL    Creatinine 0.82 0.55 - 1.02 mg/dL    Bun/Cre Ratio 22 (H) 12 - 20      Est, Glom Filt Rate >60 >60 ml/min/1.73m2    Calcium 9.7 8.5 - 10.1 mg/dL   CBC with Auto Differential    Collection Time: 08/02/23  8:12 AM   Result Value Ref Range    WBC 8.3 3.6 - 11.0 K/uL    RBC 4.74 3.80 - 5.20 M/uL    Hemoglobin 14.7 11.5 - 16.0 g/dL    Hematocrit 42.5 35.0 - 47.0 %    MCV 89.7 80.0 - 99.0 FL    MCH 31.0 26.0 - 34.0 PG    MCHC 34.6 30.0 - 36.5 g/dL    RDW 14.9 (H) 11.5 - 14.5 %    Platelets 464 180 - 525 K/uL    MPV 10.4 8.9 - 12.9 FL    Neutrophils % 78 (H) 32 - 75 %    Lymphocytes % 13 12 - 49 %    Monocytes % 6 5 - 13 %    Eosinophils % 2 0 - 7 %    Basophils % 1 0 - 1 %    Immature Granulocytes 0 0.0 - 0.5 %    Neutrophils Absolute 6.6 1.8 - 8.0 K/UL    Lymphocytes Absolute 1.1 0.8 - 3.5 K/UL    Monocytes Absolute 0.5 0.0 - 1.0 K/UL    Eosinophils Absolute 0.1 0.0 - 0.4 K/UL    Basophils Absolute 0.1 0.0 - 0.1 K/UL    Absolute Immature Granulocyte 0.0 0.00 - 0.04 K/UL    Differential Type AUTOMATED     Urinalysis with Reflex to Culture    Collection Time: 08/02/23  9:15 AM    Specimen: Urine   Result
+ umbilical swelling

## 2024-10-14 NOTE — ED ADULT NURSE NOTE - NS ED NOTE ABUSE SUSPICION NEGLECT YN
Deteriorating patient status - Patient was clinically upgraded due to deteriorating patient status.
No

## 2024-10-14 NOTE — ED PROVIDER NOTE - CLINICAL SUMMARY MEDICAL DECISION MAKING FREE TEXT BOX
This patient is a 69-year-old male presenting to the emergency room today with complaints of abdominal pain.  CBC shows evidence of leukocytosis of 11 cell count of 11.23.  No evidence of acute anemia.  No evidence of thrombocytopenia.  INR is within normal limits.  No evidence of significant electrolyte abnormalities.  Creatinine is within normal limits.  Minimal suspicion for MESSI.  No evidence of elevated LFTs.  A CT of the abdomen pelvis with IV contrast was ordered due to this mass located in the patient's umbilicus.      This has not been completed at this time.  For this reason, the patient will be signed out to the oncoming physician.  Please see the oncoming physician's addendum's, notes, and progress notes for any change in this patient's management or care. This patient is a 69-year-old male presenting to the emergency room today with complaints of abdominal pain.  CBC shows evidence of leukocytosis of 11 cell count of 11.23.  No evidence of acute anemia.  No evidence of thrombocytopenia.  INR is within normal limits.  No evidence of significant electrolyte abnormalities.  Creatinine is within normal limits.  Minimal suspicion for MESSI.  No evidence of elevated LFTs.  A CT of the abdomen pelvis with IV contrast was ordered due to this mass located in the patient's umbilicus.      CT of the abdomen pelvis with IV contrast was performed showing evidence of an umbilical hernia with possible incarceration.  There is resultant small bowel obstruction and possible incarceration.  No evidence of pneumatosis at this time.  No evidence of pneumoperitoneum.  General surgery was paged to discuss these findings. This patient is a 69-year-old male presenting to the emergency room today with complaints of abdominal pain.  CBC shows evidence of leukocytosis of 11 cell count of 11.23.  No evidence of acute anemia.  No evidence of thrombocytopenia.  INR is within normal limits.  No evidence of significant electrolyte abnormalities.  Creatinine is within normal limits.  Minimal suspicion for MESSI.  No evidence of elevated LFTs.  A CT of the abdomen pelvis with IV contrast was ordered due to this mass located in the patient's umbilicus.      CT of the abdomen pelvis with IV contrast was performed showing evidence of an umbilical hernia with possible incarceration.  There is resultant small bowel obstruction and possible incarceration.  No evidence of pneumatosis at this time.  No evidence of pneumoperitoneum.  General surgery was paged to discuss these findings.  Surgery stated they would come and evaluate the patient.  Further recommendation, lactic acid order was sent as well.    At this time, the surgery team does not come to evaluate the patient.  For this reason, the patient will be signed out to the oncoming physician.  Please see the oncoming physician's addendum's, notes, and progress notes for any change in this patient's management or care. This patient is a 69-year-old male presenting to the emergency room today with complaints of abdominal pain.  CBC shows evidence of leukocytosis of 11 cell count of 11.23.  No evidence of acute anemia.  No evidence of thrombocytopenia.  INR is within normal limits.  No evidence of significant electrolyte abnormalities.  Creatinine is within normal limits.  Minimal suspicion for MESSI.  No evidence of elevated LFTs.  A CT of the abdomen pelvis with IV contrast was ordered due to this mass located in the patient's umbilicus.      CT of the abdomen pelvis with IV contrast was performed showing evidence of an umbilical hernia with possible incarceration.  There is resultant small bowel obstruction and possible incarceration.  No evidence of pneumatosis at this time.  No evidence of pneumoperitoneum.  General surgery was paged to discuss these findings.  Surgery stated they would come and evaluate the patient.  Further recommendation, lactic acid order was sent as well.    At this time, the surgery team does not come to evaluate the patient.  For this reason, the patient will be signed out to the oncoming physician.  Please see the oncoming physician's addendum's, notes, and progress notes for any change in this patient's management or care.    Surgical service consulted upon patient and was able to reduce umbilical hernia with SBO.  Patient opting to not have surgery at this time after discussion with surgical team patient may be released home.  Patient is able to tolerate p.o. intake and on subsequent CT abdomen noted that SBO has now resolved after reduction.

## 2024-10-14 NOTE — ED PROVIDER NOTE - PATIENT PORTAL LINK FT
You can access the FollowMyHealth Patient Portal offered by [facility] by registering at the following website: [email or web address]. By joining [facility]’s FollowMyHealth portal, you will also be able to view your health information using other applications (apps) compatible with our system.

## 2024-10-14 NOTE — CONSULT NOTE ADULT - ASSESSMENT
70 y/o male with incarcerated umbilical Hernia causing SBO.     Hernia reduced at bedside. Repeat CT scan showing resolution of SBO and reduced hernia.   Give PO challenge, If tolerates diet can be discharged home and follow up with Dr Marie for elective repair of Umbilical hernia  Discussed with Dr Marie

## 2024-10-14 NOTE — ED ADULT NURSE NOTE - OBJECTIVE STATEMENT
Redness, pain, and mild increase in size to umbilicus Pt reports t Redness, pain, and mild increase in size to umbilicus Pt reports this began yesterday after drinking apple juice. Denies N/V/d or constipation , + chills, denies fevers. -

## 2024-10-15 ENCOUNTER — TRANSCRIPTION ENCOUNTER (OUTPATIENT)
Age: 69
End: 2024-10-15

## 2024-10-15 ENCOUNTER — INPATIENT (INPATIENT)
Facility: HOSPITAL | Age: 69
LOS: 0 days | Discharge: HOME | End: 2024-10-16
Attending: SURGERY | Admitting: SURGERY
Payer: MEDICARE

## 2024-10-15 VITALS
DIASTOLIC BLOOD PRESSURE: 86 MMHG | WEIGHT: 184.97 LBS | HEIGHT: 71 IN | RESPIRATION RATE: 15 BRPM | OXYGEN SATURATION: 96 % | SYSTOLIC BLOOD PRESSURE: 127 MMHG | TEMPERATURE: 98 F | HEART RATE: 92 BPM

## 2024-10-15 DIAGNOSIS — Z98.890 OTHER SPECIFIED POSTPROCEDURAL STATES: Chronic | ICD-10-CM

## 2024-10-15 LAB
ALBUMIN SERPL ELPH-MCNC: 3.7 G/DL — SIGNIFICANT CHANGE UP (ref 3.3–5)
ALP SERPL-CCNC: 86 U/L — SIGNIFICANT CHANGE UP (ref 40–120)
ALT FLD-CCNC: 27 U/L — SIGNIFICANT CHANGE UP (ref 12–78)
ANION GAP SERPL CALC-SCNC: 7 MMOL/L — SIGNIFICANT CHANGE UP (ref 5–17)
APTT BLD: 28.2 SEC — SIGNIFICANT CHANGE UP (ref 24.5–35.6)
AST SERPL-CCNC: 18 U/L — SIGNIFICANT CHANGE UP (ref 15–37)
BASOPHILS # BLD AUTO: 0.02 K/UL — SIGNIFICANT CHANGE UP (ref 0–0.2)
BASOPHILS NFR BLD AUTO: 0.2 % — SIGNIFICANT CHANGE UP (ref 0–2)
BILIRUB SERPL-MCNC: 1.1 MG/DL — SIGNIFICANT CHANGE UP (ref 0.2–1.2)
BLD GP AB SCN SERPL QL: SIGNIFICANT CHANGE UP
BUN SERPL-MCNC: 24 MG/DL — HIGH (ref 7–23)
CALCIUM SERPL-MCNC: 9.3 MG/DL — SIGNIFICANT CHANGE UP (ref 8.5–10.1)
CHLORIDE SERPL-SCNC: 108 MMOL/L — SIGNIFICANT CHANGE UP (ref 96–108)
CO2 SERPL-SCNC: 25 MMOL/L — SIGNIFICANT CHANGE UP (ref 22–31)
CREAT SERPL-MCNC: 1.12 MG/DL — SIGNIFICANT CHANGE UP (ref 0.5–1.3)
EGFR: 72 ML/MIN/1.73M2 — SIGNIFICANT CHANGE UP
EOSINOPHIL # BLD AUTO: 0.02 K/UL — SIGNIFICANT CHANGE UP (ref 0–0.5)
EOSINOPHIL NFR BLD AUTO: 0.2 % — SIGNIFICANT CHANGE UP (ref 0–6)
GLUCOSE SERPL-MCNC: 134 MG/DL — HIGH (ref 70–99)
HCT VFR BLD CALC: 44.7 % — SIGNIFICANT CHANGE UP (ref 39–50)
HGB BLD-MCNC: 15.4 G/DL — SIGNIFICANT CHANGE UP (ref 13–17)
IMM GRANULOCYTES NFR BLD AUTO: 0.5 % — SIGNIFICANT CHANGE UP (ref 0–0.9)
INR BLD: 1.1 RATIO — SIGNIFICANT CHANGE UP (ref 0.85–1.16)
LACTATE SERPL-SCNC: 1.1 MMOL/L — SIGNIFICANT CHANGE UP (ref 0.7–2)
LYMPHOCYTES # BLD AUTO: 1.52 K/UL — SIGNIFICANT CHANGE UP (ref 1–3.3)
LYMPHOCYTES # BLD AUTO: 11.5 % — LOW (ref 13–44)
MCHC RBC-ENTMCNC: 31 PG — SIGNIFICANT CHANGE UP (ref 27–34)
MCHC RBC-ENTMCNC: 34.5 G/DL — SIGNIFICANT CHANGE UP (ref 32–36)
MCV RBC AUTO: 90.1 FL — SIGNIFICANT CHANGE UP (ref 80–100)
MONOCYTES # BLD AUTO: 2.01 K/UL — HIGH (ref 0–0.9)
MONOCYTES NFR BLD AUTO: 15.2 % — HIGH (ref 2–14)
NEUTROPHILS # BLD AUTO: 9.61 K/UL — HIGH (ref 1.8–7.4)
NEUTROPHILS NFR BLD AUTO: 72.4 % — SIGNIFICANT CHANGE UP (ref 43–77)
NRBC # BLD: 0 /100 WBCS — SIGNIFICANT CHANGE UP (ref 0–0)
PLATELET # BLD AUTO: 268 K/UL — SIGNIFICANT CHANGE UP (ref 150–400)
POTASSIUM SERPL-MCNC: 3.7 MMOL/L — SIGNIFICANT CHANGE UP (ref 3.5–5.3)
POTASSIUM SERPL-SCNC: 3.7 MMOL/L — SIGNIFICANT CHANGE UP (ref 3.5–5.3)
PROT SERPL-MCNC: 7.9 GM/DL — SIGNIFICANT CHANGE UP (ref 6–8.3)
PROTHROM AB SERPL-ACNC: 12.7 SEC — SIGNIFICANT CHANGE UP (ref 9.9–13.4)
RBC # BLD: 4.96 M/UL — SIGNIFICANT CHANGE UP (ref 4.2–5.8)
RBC # FLD: 12.7 % — SIGNIFICANT CHANGE UP (ref 10.3–14.5)
SODIUM SERPL-SCNC: 140 MMOL/L — SIGNIFICANT CHANGE UP (ref 135–145)
WBC # BLD: 13.25 K/UL — HIGH (ref 3.8–10.5)
WBC # FLD AUTO: 13.25 K/UL — HIGH (ref 3.8–10.5)

## 2024-10-15 PROCEDURE — 49594 RPR AA HRN 1ST 3-10 NCR/STRN: CPT | Mod: AS

## 2024-10-15 PROCEDURE — 71045 X-RAY EXAM CHEST 1 VIEW: CPT | Mod: 26

## 2024-10-15 PROCEDURE — 99285 EMERGENCY DEPT VISIT HI MDM: CPT

## 2024-10-15 PROCEDURE — 88302 TISSUE EXAM BY PATHOLOGIST: CPT | Mod: 26

## 2024-10-15 PROCEDURE — 93010 ELECTROCARDIOGRAM REPORT: CPT

## 2024-10-15 DEVICE — MESH HERNIA VENTRALEX ST SMALL 1.7": Type: IMPLANTABLE DEVICE | Status: FUNCTIONAL

## 2024-10-15 RX ORDER — ONDANSETRON HCL/PF 4 MG/2 ML
4 VIAL (ML) INJECTION EVERY 6 HOURS
Refills: 0 | Status: DISCONTINUED | OUTPATIENT
Start: 2024-10-15 | End: 2024-10-16

## 2024-10-15 RX ORDER — HYDROMORPHONE HYDROCHLORIDE 1 MG/ML
0.5 INJECTION, SOLUTION INTRAMUSCULAR; INTRAVENOUS; SUBCUTANEOUS
Refills: 0 | Status: DISCONTINUED | OUTPATIENT
Start: 2024-10-15 | End: 2024-10-16

## 2024-10-15 RX ORDER — HYDROMORPHONE HYDROCHLORIDE 1 MG/ML
1 INJECTION, SOLUTION INTRAMUSCULAR; INTRAVENOUS; SUBCUTANEOUS
Refills: 0 | Status: DISCONTINUED | OUTPATIENT
Start: 2024-10-15 | End: 2024-10-16

## 2024-10-15 RX ORDER — SODIUM CHLORIDE IRRIG SOLUTION 0.9 %
1000 SOLUTION, IRRIGATION IRRIGATION
Refills: 0 | Status: DISCONTINUED | OUTPATIENT
Start: 2024-10-15 | End: 2024-10-15

## 2024-10-15 RX ORDER — MORPHINE SULFATE 30 MG/1
2 TABLET, FILM COATED, EXTENDED RELEASE ORAL EVERY 4 HOURS
Refills: 0 | Status: DISCONTINUED | OUTPATIENT
Start: 2024-10-15 | End: 2024-10-16

## 2024-10-15 RX ORDER — CEFOTETAN DISODIUM 1 G
1 VIAL (EA) INJECTION EVERY 12 HOURS
Refills: 0 | Status: DISCONTINUED | OUTPATIENT
Start: 2024-10-15 | End: 2024-10-16

## 2024-10-15 RX ORDER — OXYCODONE HYDROCHLORIDE 30 MG/1
5 TABLET, FILM COATED, EXTENDED RELEASE ORAL EVERY 4 HOURS
Refills: 0 | Status: DISCONTINUED | OUTPATIENT
Start: 2024-10-15 | End: 2024-10-16

## 2024-10-15 RX ORDER — SODIUM CHLORIDE 0.9 % (FLUSH) 0.9 %
3 SYRINGE (ML) INJECTION EVERY 8 HOURS
Refills: 0 | Status: DISCONTINUED | OUTPATIENT
Start: 2024-10-15 | End: 2024-10-15

## 2024-10-15 RX ADMIN — Medication 75 MILLILITER(S): at 21:49

## 2024-10-15 NOTE — BRIEF OPERATIVE NOTE - NSICDXBRIEFPROCEDURE_GEN_ALL_CORE_FT
PROCEDURES:  Repair of nonreducible anterior abdominal wall hernia without history of prior repair, with insertion of mesh, defect 3 cm to 10 cm in length 15-Oct-2024 22:08:43  Severo Hudson

## 2024-10-15 NOTE — ED PROVIDER NOTE - CLINICAL SUMMARY MEDICAL DECISION MAKING FREE TEXT BOX
69M who presents for repair of umbilical hernia  -preop labs, ecg, cxr  -admit for surgery  -npo  -pt declines my offer of analgesia

## 2024-10-15 NOTE — ED ADULT TRIAGE NOTE - CHIEF COMPLAINT QUOTE
was sent by dr davis for umbilical hernia repair/surgery tonight, patient not complaining of anything at this time

## 2024-10-15 NOTE — H&P ADULT - HISTORY OF PRESENT ILLNESS
68 y/o male presented to ER with c/o umbilical abdominal pain which started today.  Pt has known umbilical area, was reduced in ER here yesterday.   This AM it became more swollen and tender, prompting him to come to ER. Denies nausea and vomiting fever, chills, chest pain or sob.

## 2024-10-15 NOTE — H&P ADULT - ASSESSMENT
Pt is here with incarcerated umbilical hernia.    Take to OR now for open hernia repair, possible bowel resection  Pt agrees with plan  Discussed with Dr Marie

## 2024-10-15 NOTE — ED ADULT NURSE NOTE - OBJECTIVE STATEMENT
89 AAOx3 pt presents to ED, sent by Dr. Marie for surgery of umbilical hernia. Pt reports abdominal pain started on Sunday, was seen earlier today by Dr. Marie. Pt denies N/V/D. Denies PMH. Pt had a little cranberry juice around 3pm

## 2024-10-15 NOTE — ED PROVIDER NOTE - OBJECTIVE STATEMENT
The patient states he was called today by the surgery team and told that Dr. Marie will repair his umbilical hernia tonight so he should come to the ED. The patient was seen in the ED yesterday for abdominal pain and was found to have an umbilical hernia on CT which was reduced. He is having pain on palpation of the umbilicus and denies vomiting.

## 2024-10-15 NOTE — BRIEF OPERATIVE NOTE - OPERATION/FINDINGS
Repair of nonreducible anterior abdominal wall hernia without history of prior repair, with insertion of mesh, defect 3 cm to 10 cm in length. No bowel in hernia sac. Hernia sac and contents ligated. hemostasis achieved.

## 2024-10-15 NOTE — PATIENT PROFILE ADULT - FUNCTIONAL ASSESSMENT - BASIC MOBILITY 2.
4 = No assist / stand by assistance Surgeon/Pathologist Verbiage (Will Incorporate Name Of Surgeon From Intro If Not Blank): operated in two distinct and integrated capacities as the surgeon and pathologist.

## 2024-10-15 NOTE — ED PROVIDER NOTE - NSICDXPASTSURGICALHX_GEN_ALL_CORE_FT
Aman Palm is calling to get a rx for splint thumb spica, was ordered 09-20 but pt has a paper that looks like an order. Pharmacy wont fill, since it's not a Rx, mAan Palm says she cant afford to pay OTC. Please call patient when Rx is ready. PAST SURGICAL HISTORY:  History of ankle surgery left ankle 4/2018

## 2024-10-15 NOTE — H&P ADULT - NSHPPHYSICALEXAM_GEN_ALL_CORE
General: NAD  Chest: normal work of breathing  Cardiovascular: RRR  Abdomen:  Soft, mildly distended, + umbilical hernia with some surrounding erythema  Extremities:  no cyanosis, clubbing or edema  Skin:  No rash/erythema/ecchymoses/petechiae/wounds/abscess/warm/dry  Neuro/Psych:  Alert, oriented, no asterixis, no tremor, no encephalopathy

## 2024-10-15 NOTE — PATIENT PROFILE ADULT - FALL HARM RISK - HARM RISK INTERVENTIONS
Monitor for mental status changes/Monitor gait and stability/Reinforce activity limits and safety measures with patient and family/Use of alarms - bed, chair and/or voice tab/Visual Cue: Yellow wristband and red socks/Bed in lowest position, wheels locked, appropriate side rails in place/Call bell, personal items and telephone in reach/Instruct patient to call for assistance before getting out of bed or chair/Non-slip footwear when patient is out of bed/[place] to call system/Physically safe environment - no spills, clutter or unnecessary equipment/Purposeful Proactive Rounding/Room/bathroom lighting operational, light cord in reach

## 2024-10-16 ENCOUNTER — TRANSCRIPTION ENCOUNTER (OUTPATIENT)
Age: 69
End: 2024-10-16

## 2024-10-16 VITALS
RESPIRATION RATE: 16 BRPM | DIASTOLIC BLOOD PRESSURE: 78 MMHG | SYSTOLIC BLOOD PRESSURE: 139 MMHG | TEMPERATURE: 98 F | OXYGEN SATURATION: 99 % | HEART RATE: 73 BPM

## 2024-10-16 LAB
ANION GAP SERPL CALC-SCNC: 7 MMOL/L — SIGNIFICANT CHANGE UP (ref 5–17)
BASOPHILS # BLD AUTO: 0.01 K/UL — SIGNIFICANT CHANGE UP (ref 0–0.2)
BASOPHILS NFR BLD AUTO: 0.1 % — SIGNIFICANT CHANGE UP (ref 0–2)
BUN SERPL-MCNC: 19 MG/DL — SIGNIFICANT CHANGE UP (ref 7–23)
CALCIUM SERPL-MCNC: 8.5 MG/DL — SIGNIFICANT CHANGE UP (ref 8.5–10.1)
CHLORIDE SERPL-SCNC: 109 MMOL/L — HIGH (ref 96–108)
CO2 SERPL-SCNC: 24 MMOL/L — SIGNIFICANT CHANGE UP (ref 22–31)
CREAT SERPL-MCNC: 1.03 MG/DL — SIGNIFICANT CHANGE UP (ref 0.5–1.3)
EGFR: 80 ML/MIN/1.73M2 — SIGNIFICANT CHANGE UP
EOSINOPHIL # BLD AUTO: 0 K/UL — SIGNIFICANT CHANGE UP (ref 0–0.5)
EOSINOPHIL NFR BLD AUTO: 0 % — SIGNIFICANT CHANGE UP (ref 0–6)
GLUCOSE SERPL-MCNC: 130 MG/DL — HIGH (ref 70–99)
HCT VFR BLD CALC: 40.7 % — SIGNIFICANT CHANGE UP (ref 39–50)
HGB BLD-MCNC: 13.4 G/DL — SIGNIFICANT CHANGE UP (ref 13–17)
IMM GRANULOCYTES NFR BLD AUTO: 0.5 % — SIGNIFICANT CHANGE UP (ref 0–0.9)
LYMPHOCYTES # BLD AUTO: 0.69 K/UL — LOW (ref 1–3.3)
LYMPHOCYTES # BLD AUTO: 8.5 % — LOW (ref 13–44)
MAGNESIUM SERPL-MCNC: 1.9 MG/DL — SIGNIFICANT CHANGE UP (ref 1.6–2.6)
MCHC RBC-ENTMCNC: 30.2 PG — SIGNIFICANT CHANGE UP (ref 27–34)
MCHC RBC-ENTMCNC: 32.9 G/DL — SIGNIFICANT CHANGE UP (ref 32–36)
MCV RBC AUTO: 91.9 FL — SIGNIFICANT CHANGE UP (ref 80–100)
MONOCYTES # BLD AUTO: 0.64 K/UL — SIGNIFICANT CHANGE UP (ref 0–0.9)
MONOCYTES NFR BLD AUTO: 7.9 % — SIGNIFICANT CHANGE UP (ref 2–14)
NEUTROPHILS # BLD AUTO: 6.72 K/UL — SIGNIFICANT CHANGE UP (ref 1.8–7.4)
NEUTROPHILS NFR BLD AUTO: 83 % — HIGH (ref 43–77)
NRBC # BLD: 0 /100 WBCS — SIGNIFICANT CHANGE UP (ref 0–0)
PHOSPHATE SERPL-MCNC: 2.4 MG/DL — LOW (ref 2.5–4.5)
PLATELET # BLD AUTO: 209 K/UL — SIGNIFICANT CHANGE UP (ref 150–400)
POTASSIUM SERPL-MCNC: 4 MMOL/L — SIGNIFICANT CHANGE UP (ref 3.5–5.3)
POTASSIUM SERPL-SCNC: 4 MMOL/L — SIGNIFICANT CHANGE UP (ref 3.5–5.3)
RBC # BLD: 4.43 M/UL — SIGNIFICANT CHANGE UP (ref 4.2–5.8)
RBC # FLD: 12.6 % — SIGNIFICANT CHANGE UP (ref 10.3–14.5)
SODIUM SERPL-SCNC: 140 MMOL/L — SIGNIFICANT CHANGE UP (ref 135–145)
WBC # BLD: 8.1 K/UL — SIGNIFICANT CHANGE UP (ref 3.8–10.5)
WBC # FLD AUTO: 8.1 K/UL — SIGNIFICANT CHANGE UP (ref 3.8–10.5)

## 2024-10-16 RX ORDER — BACILLUS COAGULANS/INULIN 1B-250 MG
1 CAPSULE ORAL
Qty: 20 | Refills: 0
Start: 2024-10-16

## 2024-10-16 RX ORDER — OXYCODONE HYDROCHLORIDE 30 MG/1
1 TABLET, FILM COATED, EXTENDED RELEASE ORAL
Qty: 12 | Refills: 0
Start: 2024-10-16 | End: 2024-10-18

## 2024-10-16 RX ORDER — OXYCODONE HYDROCHLORIDE 30 MG/1
10 TABLET, FILM COATED, EXTENDED RELEASE ORAL EVERY 6 HOURS
Refills: 0 | Status: DISCONTINUED | OUTPATIENT
Start: 2024-10-16 | End: 2024-10-16

## 2024-10-16 RX ORDER — SOD PHOS DI, MONO/K PHOS MONO 250 MG
1 TABLET ORAL ONCE
Refills: 0 | Status: COMPLETED | OUTPATIENT
Start: 2024-10-16 | End: 2024-10-16

## 2024-10-16 RX ORDER — ACETAMINOPHEN 325 MG
650 TABLET ORAL EVERY 6 HOURS
Refills: 0 | Status: DISCONTINUED | OUTPATIENT
Start: 2024-10-16 | End: 2024-10-16

## 2024-10-16 RX ADMIN — Medication 100 GRAM(S): at 05:09

## 2024-10-16 RX ADMIN — Medication 1 PACKET(S): at 12:58

## 2024-10-16 RX ADMIN — Medication 5000 UNIT(S): at 05:09

## 2024-10-16 NOTE — DISCHARGE NOTE PROVIDER - CARE PROVIDER_API CALL
Latonia Marie  Surgery  [address] [place] [id number]  Phone: [phone]  Fax: [phone]  Follow Up Time: 2 weeks

## 2024-10-16 NOTE — DISCHARGE NOTE NURSING/CASE MANAGEMENT/SOCIAL WORK - NSDCVIVACCINE_GEN_ALL_CORE_FT
Tdap; 17-Jul-2023 21:18; Elly Artis (RN); [employer]; [id number] (Exp. Date: 01-May-2025); IntraMuscular; Deltoid Left.; 0.5 milliLiter(s); VIS (VIS Published: 09-May-2013, VIS Presented: 17-Jul-2023);

## 2024-10-16 NOTE — DISCHARGE NOTE PROVIDER - NSDCMRMEDTOKEN_GEN_ALL_CORE_FT
cefuroxime 500 mg oral tablet: 1 tab(s) orally 2 times a day  Probiotic Formula (Bacillus Coagulans) oral capsule: 1 cap(s) orally once a day   cefuroxime 500 mg oral tablet: 1 tab(s) orally 2 times a day  oxyCODONE 5 mg oral tablet: 1 tab(s) orally every 6 hours as needed for  severe pain MDD: 4  Probiotic Formula (Bacillus Coagulans) oral capsule: 1 cap(s) orally once a day

## 2024-10-16 NOTE — PROGRESS NOTE ADULT - SUBJECTIVE AND OBJECTIVE BOX
SURGERY Post op check:  Pt is s/p open repair of umbilical hernia  Pt seen and examined at bedside. Pain is well controlled, denies complaints. No acute events overnight.       Vital Signs Last 24 Hrs  T(C): 36.3 (16 Oct 2024 01:40), Max: 37.1 (15 Oct 2024 19:51)  T(F): 97.4 (16 Oct 2024 01:40), Max: 98.7 (15 Oct 2024 19:51)  HR: 74 (16 Oct 2024 01:40) (68 - 92)  BP: 120/74 (16 Oct 2024 01:40) (120/74 - 150/74)  BP(mean): --  RR: 17 (16 Oct 2024 01:40) (15 - 18)  SpO2: 95% (16 Oct 2024 01:40) (94% - 97%)    Parameters below as of 16 Oct 2024 01:40  Patient On (Oxygen Delivery Method): room air          PHYSICAL EXAM:    GENERAL: NAD  HEAD:  Atraumatic, Normocephalic  CHEST/LUNG: Breathing normally  HEART: RRR   ABDOMEN: non distended, soft, appropriately tender, no guarding, dressing CDI

## 2024-10-16 NOTE — DISCHARGE NOTE PROVIDER - HOSPITAL COURSE
Patient was admitted to [facility] for repair of umbilical hernia. Patient underwent open repair of umbilical hernia.  After the procedure, the patient was admitted to the surgical floor for observation and pain management. The remainder of the hospital stay was uneventful and patient was stable for discharge

## 2024-10-16 NOTE — CHART NOTE - NSCHARTNOTEFT_GEN_A_CORE
[facility]       To Whom It May Concern,     Manny Mock was admitted on 10/15/2024 , treated and discharged on 10/16/2024  from [facility].  Please call [phone] with any issues.      Thank you,    FLASH Lopez, Latonia  Surgery  [address] [place] [id number]  Phone: [phone]  Fax: [phone]

## 2024-10-16 NOTE — DISCHARGE NOTE NURSING/CASE MANAGEMENT/SOCIAL WORK - NSDCPEFALRISK_GEN_ALL_CORE
For information on Fall & Injury Prevention, visit: [email or web address] OR  [email or web address] OR  https://www.cdc.gov/steadi/patient.html

## 2024-10-16 NOTE — DISCHARGE NOTE PROVIDER - NSDCCPTREATMENT_GEN_ALL_CORE_FT
PRINCIPAL PROCEDURE  Procedure: Repair of nonreducible anterior abdominal wall hernia without history of prior repair, with insertion of mesh, defect 3 cm to 10 cm in length  Findings and Treatment:

## 2024-10-16 NOTE — DISCHARGE NOTE NURSING/CASE MANAGEMENT/SOCIAL WORK - FINANCIAL ASSISTANCE
[facility] provides services at a reduced cost to those who are determined to be eligible through [facility]’s financial assistance program. Information regarding [facility]’s financial assistance program can be found by going to [email or web address] or by calling [phone].

## 2024-10-16 NOTE — DISCHARGE NOTE PROVIDER - CARE PROVIDERS DIRECT ADDRESSES
[email or web address]

## 2024-10-16 NOTE — DISCHARGE NOTE PROVIDER - NSDCACTIVITY_GEN_ALL_CORE
No heavy lifting/straining/Follow Instructions Provided by your Surgical Team Showering allowed/Stairs allowed/Walking - Indoors allowed/No heavy lifting/straining/Walking - Outdoors allowed/Follow Instructions Provided by your Surgical Team/Activity as tolerated

## 2024-10-16 NOTE — DISCHARGE NOTE PROVIDER - NSDCFUADDINST_GEN_ALL_CORE_FT
Take tylenol and motrin around the clock for pain, alternating each every 3 hours.    Take antibiotics as prescribed.    Do not lift anything heavy for the next month.    Follow up with Dr Marie in 2 weeks.  Call the office or come to the ER if you have any fevers, chills, nausea or vomiting, increased pain or simply don't feel well.   Take tylenol and motrin every six hours for pain, alternating each every 3 hours.    Take antibiotics as prescribed.    Do not lift anything heavy for the next month.    Follow up with Dr Marie in 2 weeks. Please call to make an appointment.   Call the office or come to the ER if you have any fevers, chills, nausea or vomiting, increased pain or simply don't feel well.   Take tylenol and motrin every six hours for pain, alternating each every 3 hours.    You may take the prescribed narcotic pain medication for severe breakthrough pain. Do not drive while taking narcotic pain medication.   You may remove dressing 48 hours after surgery and shower and pat dry incision site. Do not scrub incision site.   Take antibiotics as prescribed.    Do not lift anything over 10 pounds for the next month.    Follow up with Dr Marie in 2 weeks. Please call to make an appointment.   Call the office or come to the ER if you have any fevers, chills, nausea or vomiting, increased pain or simply don't feel well.

## 2024-10-16 NOTE — PROGRESS NOTE ADULT - ASSESSMENT
Pt is POD 1 after open repair of umbilical hernia  tolerated well  Regular diet  cefotetan  likely dc today

## 2024-10-17 LAB — SURGICAL PATHOLOGY STUDY: SIGNIFICANT CHANGE UP

## 2024-10-24 DIAGNOSIS — K42.0 UMBILICAL HERNIA WITH OBSTRUCTION, WITHOUT GANGRENE: ICD-10-CM

## 2025-01-16 NOTE — ED ADULT TRIAGE NOTE - CCCP TRG CHIEF CMPLNT
Quality 226: Preventive Care And Screening: Tobacco Use: Screening And Cessation Intervention: Patient screened for tobacco use and is an ex/non-smoker Detail Level: Detailed ankle pain/injury

## 2025-06-02 ENCOUNTER — APPOINTMENT (OUTPATIENT)
Dept: ORTHOPEDIC SURGERY | Facility: CLINIC | Age: 70
End: 2025-06-02

## 2025-06-02 VITALS — BODY MASS INDEX: 28.88 KG/M2 | HEIGHT: 69 IN | WEIGHT: 195 LBS

## 2025-06-02 DIAGNOSIS — M79.605 PAIN IN LEFT LEG: ICD-10-CM

## 2025-06-02 PROCEDURE — 99204 OFFICE O/P NEW MOD 45 MIN: CPT

## 2025-06-02 PROCEDURE — 73610 X-RAY EXAM OF ANKLE: CPT | Mod: LT

## 2025-06-02 PROCEDURE — 73590 X-RAY EXAM OF LOWER LEG: CPT | Mod: LT

## 2025-08-19 ENCOUNTER — APPOINTMENT (OUTPATIENT)
Dept: ORTHOPEDIC SURGERY | Facility: CLINIC | Age: 70
End: 2025-08-19
Payer: MEDICARE

## 2025-08-19 DIAGNOSIS — Z98.890 OTHER SPECIFIED POSTPROCEDURAL STATES: ICD-10-CM

## 2025-08-19 DIAGNOSIS — S82.892S OTHER FRACTURE OF LEFT LOWER LEG, SEQUELA: ICD-10-CM

## 2025-08-19 DIAGNOSIS — Z86.19 PERSONAL HISTORY OF OTHER INFECTIOUS AND PARASITIC DISEASES: ICD-10-CM

## 2025-08-19 DIAGNOSIS — M21.42 FLAT FOOT [PES PLANUS] (ACQUIRED), LEFT FOOT: ICD-10-CM

## 2025-08-19 PROCEDURE — 99213 OFFICE O/P EST LOW 20 MIN: CPT

## (undated) DEVICE — VENODYNE/SCD SLEEVE CALF MEDIUM

## (undated) DEVICE — GLV 7.5 PROTEXIS (WHITE)

## (undated) DEVICE — DRAPE TOWEL BLUE 17" X 24"

## (undated) DEVICE — DRSG BENZOIN 0.6CC

## (undated) DEVICE — SPONGE ENDO PEANUT 5MM

## (undated) DEVICE — SOL IRR BAG NS 0.9% 3000ML

## (undated) DEVICE — TROCAR COVIDIEN VERSAONE BLADED FIXATION 11MM STANDARD

## (undated) DEVICE — PACK GENERAL LAPAROSCOPY

## (undated) DEVICE — INSUFFLATION NDL COVIDIEN SURGINEEDLE VERESS 120MM

## (undated) DEVICE — TUBING STRYKEFLOW II SUCTION / IRRIGATOR

## (undated) DEVICE — WARMING BLANKET UPPER ADULT

## (undated) DEVICE — FRA-ESU BOVIE FORCE TRIAD T0E42286E: Type: DURABLE MEDICAL EQUIPMENT